# Patient Record
Sex: FEMALE | Race: WHITE | Employment: OTHER | URBAN - NONMETROPOLITAN AREA
[De-identification: names, ages, dates, MRNs, and addresses within clinical notes are randomized per-mention and may not be internally consistent; named-entity substitution may affect disease eponyms.]

---

## 2017-12-22 ENCOUNTER — HOSPITAL ENCOUNTER (EMERGENCY)
Age: 52
Discharge: HOME | End: 2017-12-22
Payer: COMMERCIAL

## 2017-12-22 VITALS
DIASTOLIC BLOOD PRESSURE: 92 MMHG | SYSTOLIC BLOOD PRESSURE: 141 MMHG | TEMPERATURE: 97.5 F | HEART RATE: 83 BPM | BODY MASS INDEX: 35.7 KG/M2 | WEIGHT: 208 LBS | RESPIRATION RATE: 18 BRPM | OXYGEN SATURATION: 97 %

## 2017-12-22 DIAGNOSIS — N30.01 ACUTE CYSTITIS WITH HEMATURIA: Primary | ICD-10-CM

## 2017-12-22 LAB
BILIRUBIN URINE: NEGATIVE
BLOOD, URINE: ABNORMAL
CHARACTER, URINE: ABNORMAL
COLOR: YELLOW
GLUCOSE, URINE: NEGATIVE MG/DL
KETONES, URINE: NEGATIVE
LEUKOCYTES, UA: ABNORMAL
NITRATE, UA: NEGATIVE
PH UA: 6.5 (ref 5–9)
PROTEIN UA: NEGATIVE MG/DL
REFLEX TO URINE C & S: ABNORMAL
SPECIFIC GRAVITY UA: 1.01 (ref 1–1.03)
UROBILINOGEN, URINE: 0.2 EU/DL (ref 0–1)

## 2017-12-22 PROCEDURE — 87086 URINE CULTURE/COLONY COUNT: CPT

## 2017-12-22 PROCEDURE — 81003 URINALYSIS AUTO W/O SCOPE: CPT

## 2017-12-22 PROCEDURE — 99214 OFFICE O/P EST MOD 30 MIN: CPT

## 2017-12-22 PROCEDURE — 99213 OFFICE O/P EST LOW 20 MIN: CPT | Performed by: NURSE PRACTITIONER

## 2017-12-22 RX ORDER — PHENAZOPYRIDINE HYDROCHLORIDE 200 MG/1
200 TABLET, FILM COATED ORAL 3 TIMES DAILY PRN
Qty: 6 TABLET | Refills: 0 | Status: SHIPPED | OUTPATIENT
Start: 2017-12-22 | End: 2017-12-24

## 2017-12-22 RX ORDER — NITROFURANTOIN 25; 75 MG/1; MG/1
100 CAPSULE ORAL 2 TIMES DAILY
Qty: 14 CAPSULE | Refills: 0 | Status: SHIPPED | OUTPATIENT
Start: 2017-12-22 | End: 2017-12-29

## 2017-12-22 ASSESSMENT — PAIN DESCRIPTION - PAIN TYPE: TYPE: ACUTE PAIN

## 2017-12-22 ASSESSMENT — ENCOUNTER SYMPTOMS
COUGH: 0
DIARRHEA: 0
VOMITING: 0
NAUSEA: 1
EYE ITCHING: 0
CONSTIPATION: 0
RHINORRHEA: 0
EYE REDNESS: 0
BACK PAIN: 0
TROUBLE SWALLOWING: 0
EYE DISCHARGE: 0
ABDOMINAL PAIN: 0
SHORTNESS OF BREATH: 0
EYE PAIN: 0
CHEST TIGHTNESS: 0
SORE THROAT: 0
WHEEZING: 0
SINUS PRESSURE: 0

## 2017-12-22 ASSESSMENT — PAIN DESCRIPTION - DESCRIPTORS: DESCRIPTORS: ACHING

## 2017-12-22 ASSESSMENT — PAIN SCALES - GENERAL: PAINLEVEL_OUTOF10: 6

## 2017-12-22 ASSESSMENT — PAIN DESCRIPTION - LOCATION: LOCATION: ABDOMEN

## 2017-12-22 ASSESSMENT — PAIN DESCRIPTION - ORIENTATION: ORIENTATION: LOWER

## 2017-12-22 NOTE — ED PROVIDER NOTES
[facility]  Urgent Care Encounter      CHIEF COMPLAINT       Chief Complaint   Patient presents with    Dysuria    Urinary Frequency    Urinary Retention    Nausea       Nurses Notes reviewed and I agree except as noted in the HPI. HISTORY OF PRESENT ILLNESS   Kenisha Swanson is a 46 y.o. female who presents To urgent care complaints of dysuria. Patient states her symptoms started Monday and progressively worsened over the past few days. She states that she has increased her water intake and start drinking cranberry juice without any improvement of her symptoms. The history is provided by the patient. Dysuria    This is a new problem. The current episode started more than 2 days ago. The problem occurs every urination. The problem has not changed since onset. The quality of the pain is described as burning and shooting. The pain is at a severity of 6/10. The patient is experiencing no pain. There has been no fever. She is sexually active. There is no history of pyelonephritis. Associated symptoms include nausea, frequency, hematuria, urgency and flank pain. Pertinent negatives include no chills, no sweats, no vomiting and no discharge. She has tried nothing for the symptoms. Her past medical history is significant for recurrent UTIs. REVIEW OF SYSTEMS     Review of Systems   Constitutional: Negative for activity change, appetite change, chills, fatigue and fever. HENT: Negative for congestion, ear discharge, ear pain, hearing loss, postnasal drip, rhinorrhea, sinus pressure, sore throat and trouble swallowing. Eyes: Negative for pain, discharge, redness and itching. Respiratory: Negative for cough, chest tightness, shortness of breath and wheezing. Cardiovascular: Negative for chest pain, palpitations and leg swelling. Gastrointestinal: Positive for nausea. Negative for abdominal pain, constipation, diarrhea and vomiting. Endocrine: Negative.     Genitourinary: Positive for dysuria, flank pain, frequency, hematuria and urgency. Musculoskeletal: Negative for arthralgias, back pain, joint swelling and myalgias. Skin: Negative. Neurological: Negative for dizziness, light-headedness and headaches. Hematological: Negative. PAST MEDICAL HISTORY         Diagnosis Date    Arthritis     GERD (gastroesophageal reflux disease)     Lupus     Raynaud disease     Sjogren's syndrome ([facility] [address]        SURGICAL HISTORY     Patient  has a past surgical history that includes Cardiac surgery; Tonsillectomy; Adenoidectomy; Cholecystectomy; Appendectomy; Abdomen surgery; back surgery; Foot surgery; hernia repair; and Hysterectomy. CURRENT MEDICATIONS       Discharge Medication List as of 12/22/2017  9:33 AM      CONTINUE these medications which have NOT CHANGED    Details   GABAPENTIN PO Take 1,800 mg by mouthHistorical Med      cyclobenzaprine (FLEXERIL) 10 MG tablet Take 10 mg by mouth 3 times daily as needed for Muscle spasms      loratadine (CLARITIN) 10 MG tablet Take 1 tablet by mouth daily, Disp-15 tablet, R-0      esomeprazole Magnesium (NEXIUM) 40 MG PACK Take 40 mg by mouth daily      milnacipran HCl (SAVELLA) 50 MG TABS Take 50 mg by mouth 2 times daily      hydroxychloroquine (PLAQUENIL) 200 MG tablet Take by mouth daily Once daily except tues thurs sat is twice a day      topiramate (TOPAMAX) 25 MG tablet Take 25 mg by mouth 2 times daily      escitalopram (LEXAPRO) 20 MG tablet Take 20 mg by mouth nightly      cevimeline (EVOXAC) 30 MG capsule Take 30 mg by mouth 4 times daily      acetaminophen (TYLENOL) 325 MG tablet Take 325 mg by mouth every 6 hours as needed for Pain      ibuprofen (ADVIL;MOTRIN) 400 MG tablet Take 400 mg by mouth every 6 hours as needed for Pain             ALLERGIES     Patient is is allergic to biaxin [clarithromycin]. FAMILY HISTORY     Patient's family history is not on file.     SOCIAL HISTORY     Patient  reports that she quit smoking about 19 months ago. She has never used smokeless tobacco. She reports that she does not drink alcohol or use drugs. PHYSICAL EXAM     ED TRIAGE VITALS  BP: (!) 141/92, Temp: 97.5 °F (36.4 °C), Pulse: 83, Resp: 18, SpO2: 97 %  Physical Exam   Constitutional: She is oriented to person, place, and time. She appears well-developed and well-nourished. No distress. HENT:   Head: Normocephalic and atraumatic. Right Ear: External ear normal.   Left Ear: External ear normal.   Nose: Nose normal.   Mouth/Throat: Oropharynx is clear and moist. No oropharyngeal exudate. Eyes: Conjunctivae and EOM are normal. Pupils are equal, round, and reactive to light. Neck: Normal range of motion. Neck supple. No JVD present. No tracheal deviation present. No thyromegaly present. Cardiovascular: Normal rate, regular rhythm and normal heart sounds. Exam reveals no gallop and no friction rub. No murmur heard. Pulmonary/Chest: Effort normal and breath sounds normal. No stridor. No respiratory distress. She has no wheezes. She has no rales. She exhibits no tenderness. Abdominal: Soft. Bowel sounds are normal. She exhibits no distension and no mass. There is tenderness in the suprapubic area. There is CVA tenderness (bilateral). There is no rebound and no guarding. Lymphadenopathy:     She has no cervical adenopathy. Neurological: She is alert and oriented to person, place, and time. Skin: Skin is warm and dry. Psychiatric: She has a normal mood and affect. Her behavior is normal. Judgment normal.   Nursing note and vitals reviewed. DIAGNOSTIC RESULTS   Labs:No results found for this visit on 12/22/17. IMAGING:  No orders to display     URGENT CARE COURSE:     Vitals:    12/22/17 0910   BP: (!) 141/92   Pulse: 83   Resp: 18   Temp: 97.5 °F (36.4 °C)   TempSrc: Temporal   SpO2: 97%   Weight: 208 lb (94.3 kg)       Medications - No data to display  PROCEDURES:  None  FINAL IMPRESSION      1.  Acute

## 2017-12-23 LAB
ORGANISM: ABNORMAL
URINE CULTURE REFLEX: ABNORMAL

## 2018-03-10 ENCOUNTER — HOSPITAL ENCOUNTER (EMERGENCY)
Age: 53
Discharge: HOME | End: 2018-03-10
Payer: COMMERCIAL

## 2018-03-10 VITALS
HEIGHT: 64 IN | WEIGHT: 212 LBS | SYSTOLIC BLOOD PRESSURE: 147 MMHG | DIASTOLIC BLOOD PRESSURE: 86 MMHG | BODY MASS INDEX: 36.19 KG/M2 | HEART RATE: 90 BPM | RESPIRATION RATE: 18 BRPM | OXYGEN SATURATION: 99 % | TEMPERATURE: 98.1 F

## 2018-03-10 DIAGNOSIS — J01.10 ACUTE FRONTAL SINUSITIS, RECURRENCE NOT SPECIFIED: ICD-10-CM

## 2018-03-10 DIAGNOSIS — J01.00 ACUTE MAXILLARY SINUSITIS, RECURRENCE NOT SPECIFIED: Primary | ICD-10-CM

## 2018-03-10 PROCEDURE — 99213 OFFICE O/P EST LOW 20 MIN: CPT | Performed by: NURSE PRACTITIONER

## 2018-03-10 PROCEDURE — 99213 OFFICE O/P EST LOW 20 MIN: CPT

## 2018-03-10 RX ORDER — AMOXICILLIN AND CLAVULANATE POTASSIUM 875; 125 MG/1; MG/1
1 TABLET, FILM COATED ORAL 2 TIMES DAILY
Qty: 14 TABLET | Refills: 0 | Status: SHIPPED | OUTPATIENT
Start: 2018-03-10 | End: 2018-03-17

## 2018-03-10 ASSESSMENT — ENCOUNTER SYMPTOMS
CHEST TIGHTNESS: 0
SHORTNESS OF BREATH: 0
NAUSEA: 0
VOMITING: 0
DIARRHEA: 0
COUGH: 1
RHINORRHEA: 1
SINUS PAIN: 1
SORE THROAT: 1
SINUS PRESSURE: 1

## 2018-03-10 ASSESSMENT — PAIN DESCRIPTION - DESCRIPTORS: DESCRIPTORS: THROBBING

## 2018-03-10 ASSESSMENT — PAIN DESCRIPTION - LOCATION: LOCATION: FACE

## 2018-03-10 ASSESSMENT — PAIN DESCRIPTION - ORIENTATION: ORIENTATION: RIGHT

## 2018-03-10 ASSESSMENT — PAIN SCALES - GENERAL: PAINLEVEL_OUTOF10: 4

## 2018-03-10 NOTE — ED PROVIDER NOTES
[facility] URGENT CARE  Urgent Care Encounter       CHIEF COMPLAINT       Chief Complaint   Patient presents with    Sinusitis     c/o yellow nasal drainage, right facial pain, post nasal drip with cough       Nurses Notes reviewed and I agree except as noted in the HPI. HISTORY OF PRESENT ILLNESS   Dalila Collins is a 46 y.o. female who presents With complaints of right sinus pain and drainage. Symptoms have been present for the last 7-10 days. Drainage is described as yellow and foul smelling. She is having PND and states that the drainage has a foul taste as well. She feels like she has swelling to the right side of her face. She denies fever but has had occasional chills. She does have occasional cough due to sinus drainage and mild sore throat. She has been treating her symptoms with Flonase, saline nasal spray, Claritin, and sinus rinses. She did receive 4 doses of amoxicillin on February 17 prior to a dental procedure. The history is provided by the patient. No  was used. REVIEW OF SYSTEMS     Review of Systems   Constitutional: Positive for appetite change, chills and fatigue (mild). Negative for fever. HENT: Positive for congestion, facial swelling (right), postnasal drip, rhinorrhea, sinus pain, sinus pressure and sore throat. Negative for ear pain. Respiratory: Positive for cough. Negative for chest tightness and shortness of breath. Cardiovascular: Negative for chest pain. Gastrointestinal: Negative for diarrhea, nausea and vomiting. Musculoskeletal: Negative for myalgias. Neurological: Positive for headaches. Negative for dizziness. PAST MEDICAL HISTORY         Diagnosis Date    Arthritis     GERD (gastroesophageal reflux disease)     Lupus     Raynaud disease     Sjogren's syndrome ([facility] [address]        SURGICAL HISTORY     Patient  has a past surgical history that includes Cardiac surgery; Tonsillectomy; Adenoidectomy;  Cholecystectomy;

## 2018-03-10 NOTE — ED TRIAGE NOTES
TO room 3 c/o right facial pain, post nasal drip cough,  And yellow nasal drainage. Reports I had 4 teeth pulled 2/19/18 and the f;luid would run through my mouth out my nose. Not sure if connected but that Id bring up.  There is irritation on my right upper gum but might  Be new denture plate not being adjusted\"

## 2018-03-11 ASSESSMENT — ENCOUNTER SYMPTOMS: FACIAL SWELLING: 1

## 2018-07-09 ENCOUNTER — HOSPITAL ENCOUNTER (EMERGENCY)
Age: 53
Discharge: HOME | End: 2018-07-09
Payer: COMMERCIAL

## 2018-07-09 VITALS
RESPIRATION RATE: 16 BRPM | WEIGHT: 212 LBS | HEART RATE: 77 BPM | DIASTOLIC BLOOD PRESSURE: 84 MMHG | TEMPERATURE: 97.3 F | OXYGEN SATURATION: 98 % | SYSTOLIC BLOOD PRESSURE: 134 MMHG | BODY MASS INDEX: 36.39 KG/M2

## 2018-07-09 DIAGNOSIS — S39.012A LUMBAR STRAIN, INITIAL ENCOUNTER: Primary | ICD-10-CM

## 2018-07-09 DIAGNOSIS — M62.838 SPASM OF MUSCLE: ICD-10-CM

## 2018-07-09 LAB
BILIRUBIN URINE: NEGATIVE
BLOOD, URINE: NEGATIVE
CHARACTER, URINE: CLEAR
COLOR: YELLOW
GLUCOSE, URINE: NEGATIVE MG/DL
KETONES, URINE: NEGATIVE
LEUKOCYTES, UA: NEGATIVE
NITRATE, UA: NEGATIVE
PH UA: 5.5 (ref 5–9)
PROTEIN UA: NEGATIVE MG/DL
REFLEX TO URINE C & S: NORMAL
SPECIFIC GRAVITY UA: <= 1.005 (ref 1–1.03)
UROBILINOGEN, URINE: 0.2 EU/DL (ref 0–1)

## 2018-07-09 PROCEDURE — 99214 OFFICE O/P EST MOD 30 MIN: CPT

## 2018-07-09 PROCEDURE — 6360000002 HC RX W HCPCS: Performed by: NURSE PRACTITIONER

## 2018-07-09 PROCEDURE — 96372 THER/PROPH/DIAG INJ SC/IM: CPT

## 2018-07-09 PROCEDURE — 99212 OFFICE O/P EST SF 10 MIN: CPT | Performed by: NURSE PRACTITIONER

## 2018-07-09 PROCEDURE — 81003 URINALYSIS AUTO W/O SCOPE: CPT

## 2018-07-09 RX ORDER — NAPROXEN 500 MG/1
500 TABLET ORAL 2 TIMES DAILY WITH MEALS
Qty: 30 TABLET | Refills: 0 | Status: SHIPPED | OUTPATIENT
Start: 2018-07-09

## 2018-07-09 RX ORDER — CYCLOBENZAPRINE HCL 10 MG
10 TABLET ORAL 3 TIMES DAILY PRN
Qty: 15 TABLET | Refills: 0 | Status: SHIPPED | OUTPATIENT
Start: 2018-07-09

## 2018-07-09 RX ORDER — NORTRIPTYLINE HYDROCHLORIDE 75 MG/1
75 CAPSULE ORAL NIGHTLY
COMMUNITY
End: 2021-11-29 | Stop reason: CLARIF

## 2018-07-09 RX ORDER — KETOROLAC TROMETHAMINE 30 MG/ML
60 INJECTION, SOLUTION INTRAMUSCULAR; INTRAVENOUS ONCE
Status: COMPLETED | OUTPATIENT
Start: 2018-07-09 | End: 2018-07-09

## 2018-07-09 RX ADMIN — KETOROLAC TROMETHAMINE 60 MG: 30 INJECTION, SOLUTION INTRAMUSCULAR at 18:17

## 2018-07-09 ASSESSMENT — PAIN SCALES - GENERAL
PAINLEVEL_OUTOF10: 6
PAINLEVEL_OUTOF10: 6

## 2018-07-09 ASSESSMENT — PAIN DESCRIPTION - FREQUENCY: FREQUENCY: INTERMITTENT

## 2018-07-09 ASSESSMENT — ENCOUNTER SYMPTOMS
SHORTNESS OF BREATH: 0
BACK PAIN: 1
ABDOMINAL PAIN: 0
CHOKING: 0
VOMITING: 0
NAUSEA: 0

## 2018-07-09 ASSESSMENT — PAIN DESCRIPTION - DESCRIPTORS: DESCRIPTORS: ACHING

## 2018-07-09 ASSESSMENT — PAIN DESCRIPTION - LOCATION: LOCATION: FLANK

## 2018-07-09 ASSESSMENT — PAIN DESCRIPTION - ORIENTATION: ORIENTATION: LEFT

## 2018-07-09 ASSESSMENT — PAIN DESCRIPTION - PAIN TYPE: TYPE: ACUTE PAIN

## 2018-07-09 NOTE — ED PROVIDER NOTES
[facility]  Urgent Care Encounter       CHIEF COMPLAINT       Chief Complaint   Patient presents with    Flank Pain     left flank pain, frequency, urgency       Nurses Notes reviewed and I agree except as noted in the HPI. HISTORY OF PRESENT ILLNESS   Mraa Molina is a 48 y.o. female who presents For evaluation of left back/flank pain for the past 2 days. Patient reports that the pain began as a dull ache and has continued to progress over the past 2 days. She denies any known injury or recent strenuous activity, however she states that she has had issues with her back in the past.  She reports mild urinary frequency, however she denies any dysuria or any other concerning symptoms such as chills or fever at this time. The history is provided by the patient. REVIEW OF SYSTEMS     Review of Systems   Constitutional: Negative for chills and fever. Respiratory: Negative for choking and shortness of breath. Cardiovascular: Negative for chest pain. Gastrointestinal: Negative for abdominal pain, nausea and vomiting. Genitourinary: Negative for dysuria and urgency. Musculoskeletal: Positive for back pain. Skin: Negative for rash. Neurological: Negative for weakness and numbness. PAST MEDICAL HISTORY         Diagnosis Date    Arthritis     GERD (gastroesophageal reflux disease)     Lupus     Raynaud disease     Sjogren's syndrome ([facility] [address]        SURGICAL HISTORY     Patient  has a past surgical history that includes Cardiac surgery; Tonsillectomy; Adenoidectomy; Cholecystectomy; Appendectomy; Abdomen surgery; back surgery; Foot surgery; hernia repair; and Hysterectomy.     CURRENT MEDICATIONS       Previous Medications    ACETAMINOPHEN (TYLENOL) 325 MG TABLET    Take 650 mg by mouth every 6 hours as needed for Pain     CEVIMELINE (EVOXAC) 30 MG CAPSULE    Take 30 mg by mouth 4 times daily    ESCITALOPRAM (LEXAPRO) 20 MG TABLET    Take 20 mg by mouth nightly to have high fevers, if the pain worsens, or if she has difficulty urinating she needs to present directly to the emergency department. She is agreeable to plan for discharge with outpatient follow-up as discussed. PATIENT REFERRED TO:  Sisi Kaur., DO  [address] [facility]  [phone]    Call in 1 week        DISCHARGE MEDICATIONS:  New Prescriptions    CYCLOBENZAPRINE (FLEXERIL) 10 MG TABLET    Take 1 tablet by mouth 3 times daily as needed for Muscle spasms . Do not drive or operate heavy machinery while taking this medication.     NAPROXEN (NAPROSYN) 500 MG TABLET    Take 1 tablet by mouth 2 times daily (with meals)       Discontinued Medications    CYCLOBENZAPRINE (FLEXERIL) 10 MG TABLET    Take 10 mg by mouth 3 times daily as needed for Muscle spasms    NAPROXEN SODIUM (ALEVE PO)    Take by mouth    TOPIRAMATE (TOPAMAX) 25 MG TABLET    Take 25 mg by mouth 2 times daily       Current Discharge Medication List          CARLITA Jones CNP    (Please note that portions of this note were completed with a voice recognition program.  Efforts were made to edit the dictations but occasionally words are mis-transcribed.)          CARLITA Jones CNP  07/09/18 3702

## 2018-07-09 NOTE — ED NOTES
Patient understood instructions verbally,  Follow up with PCP with any concerns, or worse flank pain with bloody urine, fever, follow up with ED. E-script,. ambulated self to lobby,stable condition. Vitals taken, pain level 5/10.      Yoandy Ledbetter LPN  11/35/99 1011

## 2018-08-23 ENCOUNTER — HOSPITAL ENCOUNTER (OUTPATIENT)
Dept: PHYSICAL THERAPY | Age: 53
Setting detail: THERAPIES SERIES
Discharge: HOME | End: 2018-08-23
Payer: COMMERCIAL

## 2018-08-23 PROCEDURE — 97162 PT EVAL MOD COMPLEX 30 MIN: CPT

## 2018-08-23 PROCEDURE — 97110 THERAPEUTIC EXERCISES: CPT

## 2018-08-23 PROCEDURE — G8991 OTHER PT/OT GOAL STATUS: HCPCS

## 2018-08-23 PROCEDURE — G8990 OTHER PT/OT CURRENT STATUS: HCPCS

## 2018-08-23 ASSESSMENT — PAIN DESCRIPTION - LOCATION: LOCATION: BACK;KNEE;HIP

## 2018-08-23 ASSESSMENT — PAIN DESCRIPTION - ORIENTATION: ORIENTATION: RIGHT;LEFT

## 2018-08-23 ASSESSMENT — PAIN DESCRIPTION - PAIN TYPE: TYPE: CHRONIC PAIN

## 2018-08-23 ASSESSMENT — PAIN SCALES - GENERAL: PAINLEVEL_OUTOF10: 8

## 2018-08-23 NOTE — FLOWSHEET NOTE
in any one section apply but please just check the spot that indicates the statement which most clearly describes your problem. Section 1 - Pain Intensity The pain is moderate at the moment = 2   Section 2 - Personal Care (Washing, dressing, etc) It is painful to look after myself and I am slow and careful = 2   Section 3 - Lifting Pain prevents me from lifting heavy weights, but I can manage light to medium weights if they are conveniently positioned = 3   Section 4 - Walking Pain prevents me from walking more than 1/2 mile = 2     Section 5 - Sitting   I can only sit in my favorite chair as long as I like = 1   Section 6 - Standing Pain prevents me from standing for more than 30 minutes = 3   Section 7 - Sleeping Because of pain I have less than 4 hours sleep = 3   Section 8 - Sex Life (if applicable) Not applicable   Section 9 - Social Life My social life is normal but increases the degree of pain = 1     Section 10 - Travelling Pain is bad but I manage journeys over two hours = 2   Total Score  19/45     Total score/total possible score   X 100  42%       Score Interpretation:  0%-20%: minimal disability The patient can cope with most living activities. Usually no treatment is indicated apart from advice on lifting sitting and exercise. 21%-40%: moderate disability The patient experiences more pain and difficulty with sitting, lifting and standing. Travel and social life are more difficult and they may be disabled from work. Personal care, sexual activity and sleeping are not grossly affected and the patient can usually be managed by conservative means. 41%-60%: severe disability Pain remains themain problem with this group but activities of daily living are affected. These patient require a detailed investigation. 61%-80%: crippled Back pain impinges on all aspects of the patient's life. Positive intervention is required. 81%-100%:  These patients are either bed-bound or exaggerating their functional, activity limitations and/or participation restrictions. See restrictions and objective section above for details. Clinical Presentation: Moderate - Evolving with Changing Characteristics: chronic LBP with LE radicular symptoms, no significant change with assessment    Decision Making: Moderate Complexity due to see above, no improvement in LE symptoms during assessment, starting in pool. Decision making was based on patient assessment and decision making process in determining plan of care and establishing reasonable expectations for measurable functional outcomes. Evaluation Complexity: Based on the findings of patient history, examination, clinical presentation, and decision making during this evaluation, the evaluation of Jose Tabor  is of medium complexity. Goals:       Short term goals  Time Frame for Short term goals: 4 weeks  Short term goal 1: Patient will report decreased LBP and LE pain from baseline 8/10 to less than 5/10 in order to tolerate standing and walking longer durations. Short term goal 2: Patient will increase BLE flexibility to 0-80 deg, and piriformis flexibility WFL in order to bend forward without increasing pain. Short term goal 3: Patient will demonstrate good abdominal bracing with UE and LE pool exercises x20 reps in order to stabilize lumbar spine and progress to land based PT. Short term goal 4: Patient will be IND with HEP and with pool program in order to continue benefits of pool outside of therapy. Long term goals  Time Frame for Long term goals : 8 weeks  Long term goal 1: Patient will improve score of Oswestry LBP disability questionnaire from baseline 42% impairment to less than 20% in order to babysit grandchildren without difficulty. Long term goal 2: Patient will squat, lunge, and climb stairs without UE support in order to decrease risk of falling during household tasks.   Long term goal 3: Patient will demonstrate good lifting techiques

## 2018-08-28 ENCOUNTER — HOSPITAL ENCOUNTER (OUTPATIENT)
Dept: PHYSICAL THERAPY | Age: 53
Setting detail: THERAPIES SERIES
Discharge: HOME | End: 2018-08-28
Payer: COMMERCIAL

## 2018-08-28 PROCEDURE — 97113 AQUATIC THERAPY/EXERCISES: CPT

## 2018-08-28 ASSESSMENT — PAIN DESCRIPTION - ORIENTATION: ORIENTATION: LOWER

## 2018-08-28 ASSESSMENT — PAIN DESCRIPTION - LOCATION: LOCATION: BACK

## 2018-08-28 ASSESSMENT — PAIN SCALES - GENERAL: PAINLEVEL_OUTOF10: 5

## 2018-08-28 ASSESSMENT — PAIN DESCRIPTION - PAIN TYPE: TYPE: CHRONIC PAIN

## 2018-08-28 NOTE — PROGRESS NOTES
Therapy - Soft Tissue Mobilization, Manual Therapy - Joint Manipulation, Home Exercise Program, Modalities    Goals:       Short term goals  Time Frame for Short term goals: 4 weeks  Short term goal 1: Patient will report decreased LBP and LE pain from baseline 8/10 to less than 5/10 in order to tolerate standing and walking longer durations. Short term goal 2: Patient will increase BLE flexibility to 0-80 deg, and piriformis flexibility WFL in order to bend forward without increasing pain. Short term goal 3: Patient will demonstrate good abdominal bracing with UE and LE pool exercises x20 reps in order to stabilize lumbar spine and progress to land based PT. Short term goal 4: Patient will be IND with HEP and with pool program in order to continue benefits of pool outside of therapy. Long term goals  Time Frame for Long term goals : 8 weeks  Long term goal 1: Patient will improve score of Oswestry LBP disability questionnaire from baseline 42% impairment to less than 20% in order to babysit grandchildren without difficulty. Long term goal 2: Patient will squat, lunge, and climb stairs without UE support in order to decrease risk of falling during household tasks. Long term goal 3: Patient will demonstrate good lifting techiques in order to care for grandchildren and volunteer at their school.           Dyllan Torres, [id number]

## 2018-08-30 ENCOUNTER — HOSPITAL ENCOUNTER (OUTPATIENT)
Dept: PHYSICAL THERAPY | Age: 53
Setting detail: THERAPIES SERIES
Discharge: HOME | End: 2018-08-30
Payer: COMMERCIAL

## 2018-08-30 PROCEDURE — 97113 AQUATIC THERAPY/EXERCISES: CPT

## 2018-08-30 ASSESSMENT — PAIN SCALES - GENERAL: PAINLEVEL_OUTOF10: 5

## 2018-08-30 ASSESSMENT — PAIN DESCRIPTION - LOCATION: LOCATION: BACK

## 2018-08-30 ASSESSMENT — PAIN DESCRIPTION - PAIN TYPE: TYPE: CHRONIC PAIN

## 2018-08-30 ASSESSMENT — PAIN DESCRIPTION - ORIENTATION: ORIENTATION: LOWER

## 2018-08-30 NOTE — PROGRESS NOTES
[place]     Time In: 3650  Time Out: 0830  Minutes: 43  Timed Code Treatment Minutes: 43 Minutes     Date: 2018  Patient Name: Naty Nevarez,  Gender:  female        CSN: [id number]   : 1965  (48 y.o.)  Referral Date : 18    Referring Practitioner: Marilee Schwarz MD      Diagnosis: N71.188 (ICD-10-CM) - Spinal stenosis, lumbar region with neurogenic claudication  Treatment Diagnosis: Lumbago, spinal stiffness, B hip stiffness, muscular weakness, numbness   Additional Pertinent Hx: Hernia repair, Bowel resection and muscle replacement , Lupus, Sjogrens syndrome, Reynaud's syndrome, chronic arthritis. General:  PT Visit Information  Onset Date: 18  PT Insurance Information: [employer] - Secondary Medicare ([facility]) -Aquatic and modalities covered. Precert YES THROUGH [employer]. WE HAVE AUTHORIZATION FOR 12 VISITS FROM 18-18 FOR CPT CODES 30471, [id number], [id number], [id number], [id number] AND ONE VISIT FOR 37725, [id number], [id number]   Total # of Visits Approved: 12  Total # of Visits to Date: 3  Plan of Care/Certification Expiration Date: 10/18/18  Progress Note Counter: 3/10 for PN. Subjective:  Comments: Physician follow up: Dr. Familia Ford (pain management), meeting with back surgeon   Other (Comment): Aquatherapy for core abd strengthening prior to spine surgery     Subjective: Patient reporting low back pain 5/10 today. She has been performing HEP at home with good tolerance. After last pool session she felt good relief.       Pain:  Patient Currently in Pain: Yes  Pain Assessment: 0-10  Pain Level: 5  Pain Type: Chronic pain  Pain Location: Back  Pain Orientation: Lower  Pain Radiating Towards: BLE radicular pain       Objective    LE Warm Up: Ambulation (F,L,R): x 2 lap each with bracing  LE Streches: Not today d/t time - Seated piriformis and hamstring stretch at step 10 seconds x 3 bilat. Bilateral calf streth off 4' steps 15 seconds x 3   Static Strengthening UEs  Shoulder Flex: X 10 in 4' with bracing   Shoulder Ext: X 10 in 4' with bracing. Shoulder ABD/ADD: X 10 in 4' with bracing  Shoulder Horiz ABD/ADD: X 10 in 4' with bracing  Rows: X 10 holding 2-3 seconds in 4' with bracing. Post shoulder rolls x 10 with bracing. Static Strengthening LEs  Heel/Toe Raises: X 10 in 4' with bracing  Squats: X 10 in 4' with bracing  Marching: X 10 in 4' with bracing  Hamstring Curls: X 10 in 4' with bracing  4-Way Hip: 3 way hip X 10 in 4' with bracing - cues for smaller range of motion     Deep H2O LE  Bike: with noolde in 4'10\" x 3 minutes with bracing  Hang: with noodle in 4'10\" x 5 minutes - no pain during hang, ankle circles x10 each direction   Hip Flex/Ext: with noolde in 4'10\" x 1 minute with bracing  Hip ABD/ADD: with noodle in 4'10\" x 1 minute with bracing     Activity Tolerance:  Activity Tolerance: Patient Tolerated treatment well    Assessment:  Assessment: Continues to require cues for abdominal bracing and posture during exercises. Decreased pain during pool session.    Prognosis: Good       Patient Education:  Patient Education: Continue with HEP                      Plan:  Times per week: 2x/week  Plan weeks: 8 weeks  Specific instructions for Next Treatment: Aquatic therapy for lower back and BLEs - deep water unloading, seated piriformis and hamstring stretching, gastroc stretching off step, BLE and UE strength  Current Treatment Recommendations: Strengthening, ROM, Balance Training, Gait Training, Stair training, Functional Mobility Training, Manual Therapy - Soft Tissue Mobilization, Manual Therapy - Joint Manipulation, Home Exercise Program, Modalities    Goals:       Short term goals  Time Frame for Short term goals: 4 weeks  Short term goal 1: Patient will report decreased LBP and LE pain from baseline 8/10 to less than 5/10 in order to tolerate standing and walking

## 2018-09-11 ENCOUNTER — HOSPITAL ENCOUNTER (OUTPATIENT)
Dept: PHYSICAL THERAPY | Age: 53
Setting detail: THERAPIES SERIES
Discharge: HOME | End: 2018-09-11
Payer: COMMERCIAL

## 2018-09-11 PROCEDURE — 97113 AQUATIC THERAPY/EXERCISES: CPT

## 2018-09-11 ASSESSMENT — PAIN DESCRIPTION - PAIN TYPE: TYPE: CHRONIC PAIN

## 2018-09-11 ASSESSMENT — PAIN DESCRIPTION - LOCATION: LOCATION: BACK

## 2018-09-11 ASSESSMENT — PAIN SCALES - GENERAL: PAINLEVEL_OUTOF10: 7

## 2018-09-11 ASSESSMENT — PAIN DESCRIPTION - ORIENTATION: ORIENTATION: LOWER

## 2018-09-11 NOTE — PROGRESS NOTES
in 4' with bracing   Shoulder Ext: X 15 in 4' with bracing. Shoulder ABD/ADD: X 15 in 4' with bracing  Shoulder Horiz ABD/ADD: X 15 in 4' with bracing  Rows: X 15 holding 2-3 seconds in 4' with bracing. Post shoulder rolls x 15 with bracing. Static Strengthening LEs  Heel/Toe Raises: X 15 in 4' with bracing  Squats: X 10 in 4' with bracing  Marching: X 15 in 4' with bracing  Hamstring Curls: X 10 in 4' with bracing  4-Way Hip: 3 way hip X 10 in 4' with bracing - cues for smaller range of motion     Deep H2O LE  Bike: with noolde in 4'10\" x 3 minutes with bracing  Hang: with noodle in 4'10\" x 5 minutes -pain 3/10 during hang, ankle circles x10 each direction   Hip Flex/Ext: with noolde in 4'10\" x 1 minute with bracing  Hip ABD/ADD: with noodle in 4'10\" x 1 minute with bracing            Activity Tolerance:  Activity Tolerance: Patient Tolerated treatment well    Assessment:  Assessment: Made progressions with reps with patient tolerating well and having no complains of increase in pain with progressions made. Pain decreased to 3/10 with hang and was still 3/10 upon getting out of pool   Prognosis: Good       Patient Education:  Patient Education: Continue with bracing and monitor response to progressions made.                        Plan:  Times per week: 2x/week  Plan weeks: 8 weeks  Specific instructions for Next Treatment: Aquatic therapy for lower back and BLEs - deep water unloading, seated piriformis and hamstring stretching, gastroc stretching off step, BLE and UE strength  Current Treatment Recommendations: Strengthening, ROM, Balance Training, Gait Training, Stair training, Functional Mobility Training, Manual Therapy - Soft Tissue Mobilization, Manual Therapy - Joint Manipulation, Home Exercise Program, Modalities    Goals:       Short term goals  Time Frame for Short term goals: 4 weeks  Short term goal 1: Patient will report decreased LBP and LE pain from baseline 8/10 to less than 5/10 in order

## 2018-09-13 ENCOUNTER — APPOINTMENT (OUTPATIENT)
Dept: PHYSICAL THERAPY | Age: 53
End: 2018-09-13
Payer: COMMERCIAL

## 2018-09-18 ENCOUNTER — HOSPITAL ENCOUNTER (OUTPATIENT)
Dept: PHYSICAL THERAPY | Age: 53
Setting detail: THERAPIES SERIES
Discharge: HOME | End: 2018-09-18
Payer: COMMERCIAL

## 2018-09-18 PROCEDURE — 97113 AQUATIC THERAPY/EXERCISES: CPT

## 2018-09-18 ASSESSMENT — PAIN SCALES - GENERAL: PAINLEVEL_OUTOF10: 7

## 2018-09-18 NOTE — PROGRESS NOTES
[place]     Time In: 0800  Time Out: 0845  Minutes: 45  Timed Code Treatment Minutes: 45 Minutes     Date: 2018  Patient Name: Naty Nevarez,  Gender:  female        CSN: [id number]   : 1965  (48 y.o.)  Referral Date : 18    Referring Practitioner: Marilee Schwarz MD      Diagnosis: H62.235 (ICD-10-CM) - Spinal stenosis, lumbar region with neurogenic claudication  Treatment Diagnosis: Lumbago, spinal stiffness, B hip stiffness, muscular weakness, numbness   Additional Pertinent Hx: Hernia repair, Bowel resection and muscle replacement , Lupus, Sjogrens syndrome, Reynaud's syndrome, chronic arthritis. General:  PT Visit Information  Onset Date: 18  PT Insurance Information: UMR - Secondary Medicare ([facility]) -Aquatic and modalities covered. Precert YES THROUGH [employer]. WE HAVE AUTHORIZATION FOR 12 VISITS FROM 18-18 FOR CPT CODES 23840, 50798, 30060, [id number], [id number] AND ONE VISIT FOR 03268, 455 1011, [id number]   Total # of Visits Approved: 12  Total # of Visits to Date: 5  Plan of Care/Certification Expiration Date: 10/18/18  Progress Note Counter: 5/10 for PN. Subjective:  Comments: Physician follow up: Dr. Familia Ford (pain management), meeting with back surgeon   Other (Comment): Aquatherapy for core abd strengthening prior to spine surgery     Subjective: Patient states that she will get intermittent tingling in the left leg. Reports the pool therapy is helping. Pain:  Patient Currently in Pain: Yes  Pain Assessment: 0-10  Pain Level: 7 (6-7/10 Lower back, down Right leg into foot)    Objective         LE Warm Up: Ambulation (F,L,R): x2 lap each with bracing  LE Streches: Seated piriformis and hamstring stretch at step 10 seconds x 3 bilat.  Bilateral calf streth off 4' steps 15 seconds x 3   Static Strengthening UEs  Shoulder Flex: 15x in 4' with bracing   Shoulder current POC    Goals:       Short term goals  Time Frame for Short term goals: 4 weeks  Short term goal 1: Patient will report decreased LBP and LE pain from baseline 8/10 to less than 5/10 in order to tolerate standing and walking longer durations. Short term goal 2: Patient will increase BLE flexibility to 0-80 deg, and piriformis flexibility WFL in order to bend forward without increasing pain. Short term goal 3: Patient will demonstrate good abdominal bracing with UE and LE pool exercises x20 reps in order to stabilize lumbar spine and progress to land based PT. Short term goal 4: Patient will be IND with HEP and with pool program in order to continue benefits of pool outside of therapy. Long term goals  Time Frame for Long term goals : 8 weeks  Long term goal 1: Patient will improve score of Oswestry LBP disability questionnaire from baseline 42% impairment to less than 20% in order to babysit grandchildren without difficulty. Long term goal 2: Patient will squat, lunge, and climb stairs without UE support in order to decrease risk of falling during household tasks. Long term goal 3: Patient will demonstrate good lifting techiques in order to care for grandchildren and volunteer at their school. Carlos Angel.  Katy Krueger, [address], 9/18/2018

## 2018-09-20 ENCOUNTER — HOSPITAL ENCOUNTER (OUTPATIENT)
Dept: PHYSICAL THERAPY | Age: 53
Setting detail: THERAPIES SERIES
Discharge: HOME | End: 2018-09-20
Payer: COMMERCIAL

## 2018-09-20 PROCEDURE — 97113 AQUATIC THERAPY/EXERCISES: CPT

## 2018-09-20 ASSESSMENT — PAIN DESCRIPTION - LOCATION: LOCATION: BACK;LEG

## 2018-09-20 ASSESSMENT — PAIN DESCRIPTION - PAIN TYPE: TYPE: CHRONIC PAIN

## 2018-09-20 ASSESSMENT — PAIN SCALES - GENERAL: PAINLEVEL_OUTOF10: 7

## 2018-09-20 ASSESSMENT — PAIN DESCRIPTION - ORIENTATION: ORIENTATION: RIGHT;LOWER

## 2018-09-20 NOTE — PROGRESS NOTES
Manual Therapy - Soft Tissue Mobilization, Manual Therapy - Joint Manipulation, Home Exercise Program, Modalities  Plan Comment: Continue with current POC    Goals:       Short term goals  Time Frame for Short term goals: 4 weeks  Short term goal 1: Patient will report decreased LBP and LE pain from baseline 8/10 to less than 5/10 in order to tolerate standing and walking longer durations. Short term goal 2: Patient will increase BLE flexibility to 0-80 deg, and piriformis flexibility WFL in order to bend forward without increasing pain. Short term goal 3: Patient will demonstrate good abdominal bracing with UE and LE pool exercises x20 reps in order to stabilize lumbar spine and progress to land based PT. Short term goal 4: Patient will be IND with HEP and with pool program in order to continue benefits of pool outside of therapy. Long term goals  Time Frame for Long term goals : 8 weeks  Long term goal 1: Patient will improve score of Oswestry LBP disability questionnaire from baseline 42% impairment to less than 20% in order to babysit grandchildren without difficulty. Long term goal 2: Patient will squat, lunge, and climb stairs without UE support in order to decrease risk of falling during household tasks. Long term goal 3: Patient will demonstrate good lifting techiques in order to care for grandchildren and volunteer at their school.           Eagle Thomson, PT

## 2018-09-25 ENCOUNTER — HOSPITAL ENCOUNTER (OUTPATIENT)
Dept: PHYSICAL THERAPY | Age: 53
Setting detail: THERAPIES SERIES
Discharge: HOME | End: 2018-09-25
Payer: COMMERCIAL

## 2018-09-25 PROCEDURE — 97113 AQUATIC THERAPY/EXERCISES: CPT

## 2018-09-25 ASSESSMENT — PAIN SCALES - GENERAL: PAINLEVEL_OUTOF10: 3

## 2018-09-25 ASSESSMENT — PAIN DESCRIPTION - LOCATION: LOCATION: BACK;LEG

## 2018-09-25 ASSESSMENT — PAIN DESCRIPTION - ORIENTATION: ORIENTATION: LOWER;RIGHT

## 2018-09-25 ASSESSMENT — PAIN DESCRIPTION - PAIN TYPE: TYPE: CHRONIC PAIN

## 2018-09-27 ENCOUNTER — HOSPITAL ENCOUNTER (OUTPATIENT)
Dept: PHYSICAL THERAPY | Age: 53
Setting detail: THERAPIES SERIES
Discharge: HOME | End: 2018-09-27
Payer: COMMERCIAL

## 2018-09-27 PROCEDURE — 97113 AQUATIC THERAPY/EXERCISES: CPT

## 2018-09-27 ASSESSMENT — PAIN DESCRIPTION - LOCATION: LOCATION: BACK;LEG

## 2018-09-27 ASSESSMENT — PAIN DESCRIPTION - ORIENTATION: ORIENTATION: LOWER;RIGHT

## 2018-09-27 ASSESSMENT — PAIN DESCRIPTION - PAIN TYPE: TYPE: CHRONIC PAIN

## 2018-09-27 ASSESSMENT — PAIN SCALES - GENERAL: PAINLEVEL_OUTOF10: 3

## 2018-09-27 NOTE — PROGRESS NOTES
[place]     Time In: 0700  Time Out: 0745  Minutes: 45  Timed Code Treatment Minutes: 45 Minutes     Date: 2018  Patient Name: Jefferson Hooper,  Gender:  female        CSN: [id number]   : 1965  (48 y.o.)  Referral Date : 18    Referring Practitioner: Rogelio Wells MD      Diagnosis: J92.161 (ICD-10-CM) - Spinal stenosis, lumbar region with neurogenic claudication  Treatment Diagnosis: Lumbago, spinal stiffness, B hip stiffness, muscular weakness, numbness   Additional Pertinent Hx: Hernia repair, Bowel resection and muscle replacement , Lupus, Sjogrens syndrome, Reynaud's syndrome, chronic arthritis. General:  PT Visit Information  Onset Date: 18  PT Insurance Information: R - Secondary Medicare ([place] [id number]) -Aquatic and modalities covered. Precert YES THROUGH [employer]. WE HAVE AUTHORIZATION FOR 12 VISITS FROM 18-18 FOR CPT CODES 39057, [id number], 68760, [id number], [id number] AND ONE VISIT FOR 90552, [id number], 17392   Total # of Visits Approved: 12  Total # of Visits to Date: 8  Plan of Care/Certification Expiration Date: 10/18/18  Progress Note Counter:  for PN. Subjective:  Comments: Physician follow up: Dr. Jenn White (pain management), meeting with back surgeon   Other (Comment): Aquatherapy for core abd strengthening prior to spine surgery     Subjective: Patient rates lower back pain 3/10 today, but tuesday after her pool session she was feeling really good so she did some projects around the house. Afterward she was very sore and her legs were buckling.  Patient is motivated - she has contacted local swimming [employer] about membership and she is starting with the weight management program.      Pain:  Patient Currently in Pain: Yes  Pain Assessment: 0-10  Pain Level: 3  Pain Type: Chronic pain  Pain Location: Back, Leg  Pain Orientation: Lower, Right      Objective                                                                                    LE Warm Up: Ambulation (F,L,R): x2 lap each with bracing  LE Streches: Seated piriformis and hamstring stretch at step 10 seconds x 3 bilat. Bilateral calf streth off 4' steps 15 seconds x 3   Static Strengthening UEs  Shoulder Flex: 20x in 4' with bracing Open paddles  Shoulder Ext: 20x in 4' with bracing Open paddles  Shoulder ABD/ADD: 20x in 4' with bracing Open paddles  Shoulder Horiz ABD/ADD: 20x in 4' with bracing Open paddles  Rows: 20x holding 2-3 seconds in 4' with bracing. Posterior shoulder rolls 15x with bracing. Push ups on bar x 10        Static Strengthening LEs  Heel/Toe Raises: 20x each in 4' with bracing  Squats: 20 x  in 4' with bracing  Marchin x in 4' with bracing  Hamstring Curls: 20 x bilateral in 4' with bracing  4-Way Hip: 3-way hip 20x bilateral in 4' with bracing - cues for smaller range of motion      Deep H2O LE  Bike: with noodle in 4'10\" x3 minutes with bracing - today trialed aquajogger belt as patient was curious about equipment to purchase for HEP. Hang: with noodle in 4'10\" x 3 minutes - no pain during hang, ankle circles x10 each direction   Hip Flex/Ext: with noodle in 4'10\" x1 minute with bracing  Hip ABD/ADD: with noodle in 4'10\" x1 minute with bracing                             Activity Tolerance:  Activity Tolerance: Patient Tolerated treatment well    Assessment:  Assessment: Patient tolerates pool session well, performing 20 reps UE and LE. Patient can recall most of program but needed reminders for the new UE exercises added. We will finish remianing visits and discharge, she is planning to join local pool. Prognosis: Good       Patient Education:  Patient Education: Continue with bracing and watching posture.                        Plan:  Times per week: 2x/week  Plan weeks: 8 weeks  Specific instructions for Next Treatment: Aquatic therapy for lower back and BLEs - deep water unloading, seated piriformis and hamstring stretching, gastroc stretching off step, BLE and UE strength  Current Treatment Recommendations: Strengthening, ROM, Balance Training, Gait Training, Stair training, Functional Mobility Training, Manual Therapy - Soft Tissue Mobilization, Manual Therapy - Joint Manipulation, Home Exercise Program, Modalities  Plan Comment: Continue with current POC - finish remaining visits and discharge    Goals:       Short term goals  Time Frame for Short term goals: 4 weeks  Short term goal 1: Patient will report decreased LBP and LE pain from baseline 8/10 to less than 5/10 in order to tolerate standing and walking longer durations. Short term goal 2: Patient will increase BLE flexibility to 0-80 deg, and piriformis flexibility WFL in order to bend forward without increasing pain. Short term goal 3: Patient will demonstrate good abdominal bracing with UE and LE pool exercises x20 reps in order to stabilize lumbar spine and progress to land based PT. Short term goal 4: Patient will be IND with HEP and with pool program in order to continue benefits of pool outside of therapy. Long term goals  Time Frame for Long term goals : 8 weeks  Long term goal 1: Patient will improve score of Oswestry LBP disability questionnaire from baseline 42% impairment to less than 20% in order to babysit grandchildren without difficulty. Long term goal 2: Patient will squat, lunge, and climb stairs without UE support in order to decrease risk of falling during household tasks. Long term goal 3: Patient will demonstrate good lifting techiques in order to care for grandchildren and volunteer at their school.           Katrina Schwarz, PT

## 2018-10-02 ENCOUNTER — HOSPITAL ENCOUNTER (OUTPATIENT)
Dept: PHYSICAL THERAPY | Age: 53
Setting detail: THERAPIES SERIES
Discharge: HOME | End: 2018-10-02
Payer: COMMERCIAL

## 2018-10-02 PROCEDURE — 97113 AQUATIC THERAPY/EXERCISES: CPT

## 2018-10-02 ASSESSMENT — PAIN SCALES - GENERAL: PAINLEVEL_OUTOF10: 5

## 2018-10-02 ASSESSMENT — PAIN DESCRIPTION - LOCATION: LOCATION: BACK

## 2018-10-02 ASSESSMENT — PAIN DESCRIPTION - ORIENTATION: ORIENTATION: RIGHT

## 2018-10-02 ASSESSMENT — PAIN DESCRIPTION - PAIN TYPE: TYPE: CHRONIC PAIN

## 2018-10-04 ENCOUNTER — HOSPITAL ENCOUNTER (OUTPATIENT)
Dept: PHYSICAL THERAPY | Age: 53
Setting detail: THERAPIES SERIES
Discharge: HOME | End: 2018-10-04
Payer: COMMERCIAL

## 2018-10-04 PROCEDURE — 97113 AQUATIC THERAPY/EXERCISES: CPT

## 2018-10-04 ASSESSMENT — PAIN DESCRIPTION - LOCATION: LOCATION: BACK

## 2018-10-04 ASSESSMENT — PAIN DESCRIPTION - PAIN TYPE: TYPE: CHRONIC PAIN

## 2018-10-04 ASSESSMENT — PAIN SCALES - GENERAL: PAINLEVEL_OUTOF10: 5

## 2018-10-04 ASSESSMENT — PAIN DESCRIPTION - ORIENTATION: ORIENTATION: RIGHT

## 2018-10-04 NOTE — PROGRESS NOTES
stretch 10 seconds x 3 bilat. Bilateral calf stretch off 4' steps 15 seconds x 3   Static Strengthening UEs  Shoulder Flex: 20x in 4' with bracing Open paddles  Shoulder Ext: 20x in 4' with bracing Open paddles  Shoulder ABD/ADD: 20x in 4' with bracing Open paddles  Shoulder Horiz ABD/ADD: 20x in 4' with bracing Open paddles  Rows: 20x holding 2-3 seconds in 4' with bracing. Posterior shoulder rolls 15x with bracing. Push ups on bar x 15        Static Strengthening LEs  Heel/Toe Raises: 20x each in 4' with bracing  Squats: 20 x  in 4' with bracing  Marchin x in 4' with bracing  Hamstring Curls: 20 x bilateral in 4' with bracing  4-Way Hip: 3-way hip 20x bilateral in 4' with bracing - cues for smaller range of motion   Dynamic Strengthening LE  Step-Ups (F,L,R): forward and lateral x 10 bilateral  Deep H2O LE  Bike: with noodle in 4'10\" x3 minutes with bracing ( only had time for 2 minutes today)  Hang: with noodle in 4'10\" x 3 minutes - no pain during hang, ankle circles x10 each direction      Activity Tolerance:  Activity Tolerance: Patient Tolerated treatment well    Assessment:  Assessment: Patient independent with pool program and has joined a local pool. Will finish remaining visits and discharge from PT. Prognosis: Good       Patient Education:  Patient Education: Monitor response to progressions made and continue with bracing throughout the day.                        Plan:  Times per week: 2x/week  Plan weeks: 8 weeks  Specific instructions for Next Treatment: Aquatic therapy for lower back and BLEs - deep water unloading, seated piriformis and hamstring stretching, gastroc stretching off step, BLE and UE strength  Current Treatment Recommendations: Strengthening, ROM, Balance Training, Gait Training, Stair training, Functional Mobility Training, Manual Therapy - Soft Tissue Mobilization, Manual Therapy - Joint Manipulation, Home Exercise Program, Modalities    Goals:       Short term goals  Time

## 2018-10-09 ENCOUNTER — HOSPITAL ENCOUNTER (OUTPATIENT)
Dept: PHYSICAL THERAPY | Age: 53
Setting detail: THERAPIES SERIES
Discharge: HOME | End: 2018-10-09
Payer: COMMERCIAL

## 2018-10-09 NOTE — PROGRESS NOTES
[facility]  PHYSICAL THERAPY MISSED TREATMENT NOTE  OUTPATIENT REHABILITATION    Date: 10/9/2018  Patient Name: Carmen Cabrera        MRN: [id number]   : 1965  (48 y.o.)  Gender: female           PT Visit Information  No Show: 1    REASON FOR MISSED TREATMENT:  Pt no show/no call for appointment today. Called pt and left message informing pt of next appointment time and date.       Junior Roth PTA [id number]

## 2018-10-11 ENCOUNTER — HOSPITAL ENCOUNTER (OUTPATIENT)
Dept: PHYSICAL THERAPY | Age: 53
Setting detail: THERAPIES SERIES
Discharge: HOME | End: 2018-10-11
Payer: COMMERCIAL

## 2018-10-11 PROCEDURE — G8992 OTHER PT/OT  D/C STATUS: HCPCS

## 2018-10-11 PROCEDURE — G8991 OTHER PT/OT GOAL STATUS: HCPCS

## 2018-10-11 PROCEDURE — 97110 THERAPEUTIC EXERCISES: CPT

## 2018-10-11 ASSESSMENT — PAIN DESCRIPTION - ORIENTATION: ORIENTATION: RIGHT

## 2018-10-11 ASSESSMENT — PAIN DESCRIPTION - PAIN TYPE: TYPE: CHRONIC PAIN

## 2018-10-11 ASSESSMENT — PAIN SCALES - GENERAL: PAINLEVEL_OUTOF10: 3

## 2018-10-11 ASSESSMENT — PAIN DESCRIPTION - LOCATION: LOCATION: BACK

## 2018-10-11 NOTE — DISCHARGE SUMMARY
[address]     Time In: 2530  Time Out: 0995  Minutes: 38  Timed Code Treatment Minutes: 38 Minutes     Date: 10/11/2018  Patient Name: Saul Paredes,  Gender:  female        CSN: [id number]   : 1965  (48 y.o.)  Referral Date : 18    Referring Practitioner: Pantera Oropeza MD      Diagnosis: G19.767 (ICD-10-CM) - Spinal stenosis, lumbar region with neurogenic claudication  Treatment Diagnosis: Lumbago, spinal stiffness, B hip stiffness, muscular weakness, numbness   Additional Pertinent Hx: Hernia repair, Bowel resection and muscle replacement , Lupus, Sjogrens syndrome, Reynaud's syndrome, chronic arthritis. General:  PT Visit Information  Onset Date: 18  PT Insurance Information: UMR - Secondary Medicare ([place] [id number]) -Aquatic and modalities covered. Precert YES THROUGH [employer]. WE HAVE AUTHORIZATION FOR 12 VISITS FROM 18-18 FOR CPT CODES 20692, [id number], 54170, [id number], [id number] AND ONE VISIT FOR 26174, 455 1011, 26598   Total # of Visits Approved: 12  Total # of Visits to Date: 6  Plan of Care/Certification Expiration Date: 10/18/18  Progress Note Counter:  for PN. Subjective:  Comments: Physician follow up: Dr. Brianda Gomes (pain management), meeting with back surgeon   Other (Comment): Aquatherapy for core abd strengthening prior to spine surgery     Subjective: Patient continues to report mild pain in midline lower back. When her pain is flared up she will also notice popping in her hips while walking. She has joined a local [employer] pool and purchased pool equipment such as [employer]. She notices that it is easier to get up off the floor, able to babysit her grandchildren easier, standing longer durations for cooking.       Pain:  Patient Currently in Pain: Yes  Pain Assessment: 0-10  Pain Level: 3  Pain Type: Chronic pain  Pain Location: Back  Pain Orientation:

## 2019-03-01 ENCOUNTER — HOSPITAL ENCOUNTER (EMERGENCY)
Age: 54
Discharge: HOME | End: 2019-03-01
Payer: COMMERCIAL

## 2019-03-01 VITALS
SYSTOLIC BLOOD PRESSURE: 133 MMHG | HEIGHT: 64 IN | DIASTOLIC BLOOD PRESSURE: 83 MMHG | BODY MASS INDEX: 35.71 KG/M2 | RESPIRATION RATE: 16 BRPM | OXYGEN SATURATION: 97 % | HEART RATE: 95 BPM | WEIGHT: 209.2 LBS | TEMPERATURE: 97.7 F

## 2019-03-01 DIAGNOSIS — R30.0 DYSURIA: ICD-10-CM

## 2019-03-01 DIAGNOSIS — J06.9 UPPER RESPIRATORY TRACT INFECTION, UNSPECIFIED TYPE: Primary | ICD-10-CM

## 2019-03-01 LAB
BILIRUBIN URINE: NEGATIVE
BLOOD, URINE: NEGATIVE
CHARACTER, URINE: CLEAR
COLOR: YELLOW
GLUCOSE, URINE: NEGATIVE MG/DL
KETONES, URINE: NEGATIVE
LEUKOCYTES, UA: NEGATIVE
NITRATE, UA: NEGATIVE
PH UA: 7 (ref 5–9)
PROTEIN UA: NEGATIVE MG/DL
REFLEX TO URINE C & S: NORMAL
SPECIFIC GRAVITY UA: 1.01 (ref 1–1.03)
UROBILINOGEN, URINE: 0.2 EU/DL (ref 0–1)

## 2019-03-01 PROCEDURE — 99213 OFFICE O/P EST LOW 20 MIN: CPT | Performed by: NURSE PRACTITIONER

## 2019-03-01 PROCEDURE — 81003 URINALYSIS AUTO W/O SCOPE: CPT

## 2019-03-01 PROCEDURE — 99214 OFFICE O/P EST MOD 30 MIN: CPT

## 2019-03-01 RX ORDER — PREDNISONE 20 MG/1
40 TABLET ORAL DAILY
Qty: 14 TABLET | Refills: 0 | Status: SHIPPED | OUTPATIENT
Start: 2019-03-01 | End: 2019-03-08

## 2019-03-01 RX ORDER — PHENAZOPYRIDINE HYDROCHLORIDE 100 MG/1
100 TABLET, FILM COATED ORAL 3 TIMES DAILY PRN
COMMUNITY
End: 2021-11-29 | Stop reason: CLARIF

## 2019-03-01 RX ORDER — BENZONATATE 100 MG/1
100 CAPSULE ORAL 3 TIMES DAILY PRN
Qty: 21 CAPSULE | Refills: 0 | Status: SHIPPED | OUTPATIENT
Start: 2019-03-01 | End: 2019-03-08

## 2019-03-01 RX ORDER — FLUCONAZOLE 150 MG/1
150 TABLET ORAL ONCE
Qty: 1 TABLET | Refills: 0 | Status: SHIPPED | OUTPATIENT
Start: 2019-03-01 | End: 2019-03-01

## 2019-03-01 RX ORDER — NITROFURANTOIN 25; 75 MG/1; MG/1
100 CAPSULE ORAL 2 TIMES DAILY
COMMUNITY
End: 2021-11-29 | Stop reason: CLARIF

## 2019-03-01 ASSESSMENT — ENCOUNTER SYMPTOMS
COUGH: 1
VOMITING: 0
ABDOMINAL PAIN: 0
SHORTNESS OF BREATH: 0

## 2019-03-01 ASSESSMENT — PAIN DESCRIPTION - ORIENTATION: ORIENTATION: LOWER

## 2019-03-01 ASSESSMENT — PAIN DESCRIPTION - LOCATION: LOCATION: ABDOMEN

## 2019-03-01 ASSESSMENT — PAIN DESCRIPTION - PAIN TYPE: TYPE: ACUTE PAIN

## 2019-03-01 ASSESSMENT — PAIN DESCRIPTION - DESCRIPTORS: DESCRIPTORS: DISCOMFORT

## 2019-03-01 ASSESSMENT — PAIN SCALES - GENERAL: PAINLEVEL_OUTOF10: 1

## 2019-03-01 ASSESSMENT — PAIN - FUNCTIONAL ASSESSMENT: PAIN_FUNCTIONAL_ASSESSMENT: ACTIVITIES ARE NOT PREVENTED

## 2021-11-29 ENCOUNTER — OFFICE VISIT (OUTPATIENT)
Dept: CARDIOLOGY CLINIC | Age: 56
End: 2021-11-29
Payer: COMMERCIAL

## 2021-11-29 VITALS
SYSTOLIC BLOOD PRESSURE: 160 MMHG | WEIGHT: 207 LBS | HEIGHT: 64 IN | DIASTOLIC BLOOD PRESSURE: 90 MMHG | HEART RATE: 80 BPM | BODY MASS INDEX: 35.34 KG/M2

## 2021-11-29 DIAGNOSIS — R00.2 HEART PALPITATIONS: ICD-10-CM

## 2021-11-29 DIAGNOSIS — R06.02 SOB (SHORTNESS OF BREATH) ON EXERTION: Primary | ICD-10-CM

## 2021-11-29 DIAGNOSIS — R94.31 ABNORMAL EKG: ICD-10-CM

## 2021-11-29 PROCEDURE — 93000 ELECTROCARDIOGRAM COMPLETE: CPT | Performed by: INTERNAL MEDICINE

## 2021-11-29 PROCEDURE — 99204 OFFICE O/P NEW MOD 45 MIN: CPT | Performed by: INTERNAL MEDICINE

## 2021-11-29 RX ORDER — AMLODIPINE BESYLATE 5 MG/1
5 TABLET ORAL DAILY PRN
Qty: 90 TABLET | Refills: 1 | Status: SHIPPED | OUTPATIENT
Start: 2021-11-29 | End: 2021-12-20

## 2021-11-29 RX ORDER — ONDANSETRON 4 MG/1
4 TABLET, FILM COATED ORAL EVERY 8 HOURS PRN
COMMUNITY

## 2021-11-29 NOTE — PROGRESS NOTES
[id number] [address] [facility] [address]  Dept: [phone]  Dept Fax: [phone]  Loc: [phone]    Visit Date: 11/29/2021    Ms. Roselind Severe is a 64 y.o. female  who presented for:  Chief Complaint   Patient presents with    New Patient    Hypertension       HPI:   64year old female with hx of open heart surgery in childhood for severe pulmonary stenosis, rheumatoid arthritis, lupus, Sjogren's syndrome, and hypertension presents to establish care and control BP. She admits to shortness of breath upon exertion. Some of her medications are currently held until her BP is under control. She also takes PRN medications for migraines. When she went to [facility] for an appointment last Tuesday before thanksgiving, manual blood pressure read 220/193. Took is again before she left and it was 190/160s, also manual because machines could not read it. States she is typically around 19/32 with systolic reaching 377 at the highest. Has had massive ehadaches lately that are more regular and unlike the ones she gets with lupus. EKG done in [facility] was concerning. QT interval was high? Possible Peft sided enlargement and concerned that there was a prior infarct. She denies chest pain, palpitations, lightheadedness, dizziness, PND, or pedal edema.          Current Outpatient Medications:     SUMATRIPTAN SUCCINATE PO, Take 25 mg by mouth once as needed for Migraine, Disp: , Rfl:     ondansetron (ZOFRAN) 4 MG tablet, Take 4 mg by mouth every 8 hours as needed for Nausea or Vomiting, Disp: , Rfl:     amLODIPine (NORVASC) 5 MG tablet, Take 1 tablet by mouth daily as needed (htn), Disp: 90 tablet, Rfl: 1    naproxen (NAPROSYN) 500 MG tablet, Take 1 tablet by mouth 2 times daily (with meals), Disp: 30 tablet, Rfl: 0    loratadine (CLARITIN) 10 MG tablet, Take 1 tablet by mouth daily, Disp: 15 tablet, Rfl: 0    esomeprazole Magnesium (NEXIUM) 40 MG PACK, Take 40 mg by mouth daily, Disp: , Rfl:     acetaminophen (TYLENOL) 325 MG tablet, Take 650 mg by mouth every 6 hours as needed for Pain , Disp: , Rfl:     cyclobenzaprine (FLEXERIL) 10 MG tablet, Take 1 tablet by mouth 3 times daily as needed for Muscle spasms . Do not drive or operate heavy machinery while taking this medication. (Patient not taking: Reported on 11/29/2021), Disp: 15 tablet, Rfl: 0    hydroxychloroquine (PLAQUENIL) 200 MG tablet, Take by mouth daily Once daily except tues thurs sat is twice a day (Patient not taking: Reported on 11/29/2021), Disp: , Rfl:     cevimeline (EVOXAC) 30 MG capsule, Take 30 mg by mouth 4 times daily (Patient not taking: Reported on 11/29/2021), Disp: , Rfl:     Past Medical History  Halima Cornelius  has a past medical history of Arthritis, GERD (gastroesophageal reflux disease), Hypertension, Lupus ([facility] [address] Raynaud disease, and Sjogren's syndrome ([facility] [address] Social History  Brielle  reports that she quit smoking about 2 years ago. Her smoking use included cigarettes. She smoked 0.50 packs per day. She has never used smokeless tobacco. She reports that she does not drink alcohol and does not use drugs. Family History  Halima Cornelius family history includes Arthritis in her mother; Cancer in her father. Past Surgical History   Past Surgical History:   Procedure Laterality Date    ABDOMEN SURGERY      ADENOIDECTOMY      APPENDECTOMY      BACK SURGERY      CARDIAC SURGERY      CHOLECYSTECTOMY      FOOT SURGERY      HERNIA REPAIR      HYSTERECTOMY      TONSILLECTOMY         Subjective:     REVIEW OF SYSTEMS  Constitutional: denies sweats, chills and fever  HENT: denies  congestion, sinus pressure, sneezing and sore throat. Eyes: denies  pain, discharge, redness and itching. Respiratory: denies apnea, cough  Gastrointestinal: denies blood in stool, constipation, diarrhea   Endocrine: denies cold intolerance, heat intolerance, polydipsia.   Genitourinary: denies dysuria, enuresis, flank pain and hematuria. Musculoskeletal: denies arthralgias, joint swelling and neck pain. Neurological: denies numbness and headaches. Psychiatric/Behavioral: denies agitation, confusion, decreased concentration and dysphoric mood    All others reviewed and are negative. Objective:     BP (!) 160/84   Pulse 80   Ht 5' 4\" (1.626 m)   Wt 207 lb (93.9 kg)   BMI 35.53 kg/m²     Wt Readings from Last 3 Encounters:   11/29/21 207 lb (93.9 kg)   03/01/19 209 lb 3.2 oz (94.9 kg)   07/09/18 212 lb (96.2 kg)     BP Readings from Last 3 Encounters:   11/29/21 (!) 160/84   03/01/19 133/83   07/09/18 134/84       PHYSICAL EXAM  Constitutional: Oriented to person, place, and time. Appears well-developed and well-nourished. HENT:   Head: Normocephalic and atraumatic. Eyes: EOM are normal. Pupils are equal, round, and reactive to light. Neck: Normal range of motion. Neck supple. No JVD present. Cardiovascular: Normal rate , normal heart sounds and intact distal pulses. Pulmonary/Chest: Effort normal and breath sounds normal. No respiratory distress. No wheezes. No rales. Abdominal: Soft. Bowel sounds are normal. No distension. There is no tenderness. Musculoskeletal: Normal range of motion. No edema. Neurological: Alert and oriented to person, place, and time. No cranial nerve deficit. Coordination normal.   Skin: Skin is warm and dry. Psychiatric: Normal mood and affect.        No results found for: CKTOTAL, CKMB, CKMBINDEX    Lab Results   Component Value Date    HCT 43.8 08/09/2011       No results found for: NA, K, CL, CO2, BUN, LABALBU, CREATININE, CALCIUM, GFRAA, LABGLOM, GLUCOSE    No results found for: ALKPHOS, ALT, AST, PROT, BILITOT, BILIDIR, IBILI, LABALBU    No results found for: MG    No results found for: INR, PROTIME      No results found for: LABA1C    No results found for: TRIG, HDL, LDLCALC, LDLDIRECT, LABVLDL    No results found for: TSH      Testing Reviewed: I haveindividually reviewed the below cardiac tests    EKG:    ECHO: No results found for this or any previous visit. STRESS:    CATH:    Assessment/Plan      Hypertension  Lupus  Sjogren's syndrome  Raynaud's disease  GERD    Has a lot of pain due to joints  Was told that high BP was due to joint pains at [facility] from [place] were reviewed  States she lost 50 lbs in last 3-4 months without trying  Will give amlodipine 5mg po prn for high BP  Will first get Echo and follow up  The patient is asked to make an attempt to improve diet and exercise patterns to aid in medical management of this problem. Advised patient to call office or seek immediate medical attention if there is any new onset of  any chest pain, sob, palpitations, lightheadedness, dizziness, orthopnea, PND or pedal edema. All medication side effects were discussed in details. Thank you for allowing me to participate in the care of this patient. Please do not hesitate to contact me for any further questions. Return in about 2 weeks (around 12/13/2021), or if symptoms worsen or fail to improve, for Regular follow up, Review testing.        Electronically signed by Daksha Liu MD [facility]  11/29/2021 at 3:58 PM EST

## 2021-12-13 ENCOUNTER — HOSPITAL ENCOUNTER (OUTPATIENT)
Dept: NON INVASIVE DIAGNOSTICS | Age: 56
Discharge: HOME | End: 2021-12-13
Payer: COMMERCIAL

## 2021-12-13 DIAGNOSIS — R06.02 SOB (SHORTNESS OF BREATH) ON EXERTION: ICD-10-CM

## 2021-12-13 DIAGNOSIS — R94.31 ABNORMAL EKG: ICD-10-CM

## 2021-12-13 DIAGNOSIS — R00.2 HEART PALPITATIONS: ICD-10-CM

## 2021-12-13 LAB
LV EF: 55 %
LVEF MODALITY: NORMAL

## 2021-12-13 PROCEDURE — 93306 TTE W/DOPPLER COMPLETE: CPT

## 2021-12-20 ENCOUNTER — OFFICE VISIT (OUTPATIENT)
Dept: CARDIOLOGY CLINIC | Age: 56
End: 2021-12-20
Payer: COMMERCIAL

## 2021-12-20 VITALS
HEIGHT: 64 IN | OXYGEN SATURATION: 97 % | BODY MASS INDEX: 34.66 KG/M2 | HEART RATE: 78 BPM | WEIGHT: 203 LBS | SYSTOLIC BLOOD PRESSURE: 170 MMHG | DIASTOLIC BLOOD PRESSURE: 108 MMHG

## 2021-12-20 DIAGNOSIS — I10 HYPERTENSION, UNSPECIFIED TYPE: Primary | ICD-10-CM

## 2021-12-20 PROCEDURE — 99214 OFFICE O/P EST MOD 30 MIN: CPT | Performed by: INTERNAL MEDICINE

## 2021-12-20 RX ORDER — AMLODIPINE BESYLATE 10 MG/1
10 TABLET ORAL DAILY PRN
Qty: 30 TABLET | Refills: 3 | Status: SHIPPED | OUTPATIENT
Start: 2021-12-20 | End: 2021-12-28 | Stop reason: SDUPTHER

## 2021-12-20 NOTE — PROGRESS NOTES
[address] [facility] [place] [address]  [facility] [address]  Dept: [phone]  Dept Fax: [phone]  Loc: [phone]    Visit Date: 12/20/2021    Ms. Isaura Howard is a 64 y.o. female  who presented for:  Chief Complaint   Patient presents with    Follow-up       HPI:   64year old female with hx of open heart surgery in childhood for severe pulmonary stenosis, rheumatoid arthritis, lupus, Sjogren's syndrome, and hypertension presents to establish care and control BP. She admits to shortness of breath upon exertion. Some of her medications are currently held until her BP is under control. She also takes PRN medications for migraines. When she went to [facility] for an appointment last Tuesday before thanksgiving, manual blood pressure read 220/193. Took is again before she left and it was 190/160s, also manual because machines could not read it. States she is typically around 99/97 with systolic reaching 704 at the highest. Has had massive ehadaches lately that are more regular and unlike the ones she gets with lupus. EKG done in [facility] was concerning. QT interval was high? Possible Peft sided enlargement and concerned that there was a prior infarct. She denies chest pain, palpitations, lightheadedness, dizziness, PND, or pedal edema. She underwent echo and is here for a follow up      Current Outpatient Medications:     amLODIPine (NORVASC) 10 MG tablet, Take 1 tablet by mouth daily as needed (htn), Disp: 30 tablet, Rfl: 3    SUMATRIPTAN SUCCINATE PO, Take 25 mg by mouth once as needed for Migraine, Disp: , Rfl:     ondansetron (ZOFRAN) 4 MG tablet, Take 4 mg by mouth every 8 hours as needed for Nausea or Vomiting, Disp: , Rfl:     cyclobenzaprine (FLEXERIL) 10 MG tablet, Take 1 tablet by mouth 3 times daily as needed for Muscle spasms . Do not drive or operate heavy machinery while taking this medication. , Disp: 15 tablet, Rfl: 0   naproxen (NAPROSYN) 500 MG tablet, Take 1 tablet by mouth 2 times daily (with meals), Disp: 30 tablet, Rfl: 0    loratadine (CLARITIN) 10 MG tablet, Take 1 tablet by mouth daily, Disp: 15 tablet, Rfl: 0    esomeprazole Magnesium (NEXIUM) 40 MG PACK, Take 40 mg by mouth daily, Disp: , Rfl:     hydroxychloroquine (PLAQUENIL) 200 MG tablet, Take by mouth daily Once daily except tues thurs sat is twice a day, Disp: , Rfl:     cevimeline (EVOXAC) 30 MG capsule, Take 30 mg by mouth 4 times daily , Disp: , Rfl:     acetaminophen (TYLENOL) 325 MG tablet, Take 650 mg by mouth every 6 hours as needed for Pain , Disp: , Rfl:     Past Medical History  Faraz Bond  has a past medical history of Arthritis, GERD (gastroesophageal reflux disease), Hypertension, Lupus ([facility] [address] Raynaud disease, and Sjogren's syndrome ([facility] [address] Social History  Brielle  reports that she quit smoking about 2 years ago. Her smoking use included cigarettes. She smoked 0.50 packs per day. She has never used smokeless tobacco. She reports that she does not drink alcohol and does not use drugs. Family History  Faraz Bond family history includes Arthritis in her mother; Cancer in her father. Past Surgical History   Past Surgical History:   Procedure Laterality Date    ABDOMEN SURGERY      ADENOIDECTOMY      APPENDECTOMY      BACK SURGERY      CARDIAC SURGERY      CHOLECYSTECTOMY      FOOT SURGERY      HERNIA REPAIR      HYSTERECTOMY      TONSILLECTOMY         Subjective:     REVIEW OF SYSTEMS  Constitutional: denies sweats, chills and fever  HENT: denies  congestion, sinus pressure, sneezing and sore throat. Eyes: denies  pain, discharge, redness and itching. Respiratory: denies apnea, cough  Gastrointestinal: denies blood in stool, constipation, diarrhea   Endocrine: denies cold intolerance, heat intolerance, polydipsia. Genitourinary: denies dysuria, enuresis, flank pain and hematuria.    Musculoskeletal: denies arthralgias, joint swelling and neck pain. Neurological: denies numbness and headaches. Psychiatric/Behavioral: denies agitation, confusion, decreased concentration and dysphoric mood    All others reviewed and are negative. Objective:     BP (!) 170/108 Comment: home machine  Pulse 78   Ht 5' 4\" (1.626 m)   Wt 203 lb (92.1 kg)   SpO2 97%   BMI 34.84 kg/m²     Wt Readings from Last 3 Encounters:   12/20/21 203 lb (92.1 kg)   11/29/21 207 lb (93.9 kg)   03/01/19 209 lb 3.2 oz (94.9 kg)     BP Readings from Last 3 Encounters:   12/20/21 (!) 170/108   11/29/21 (!) 160/90   03/01/19 133/83       PHYSICAL EXAM  Constitutional: Oriented to person, place, and time. Appears well-developed and well-nourished. HENT:   Head: Normocephalic and atraumatic. Eyes: EOM are normal. Pupils are equal, round, and reactive to light. Neck: Normal range of motion. Neck supple. No JVD present. Cardiovascular: Normal rate , normal heart sounds and intact distal pulses. Pulmonary/Chest: Effort normal and breath sounds normal. No respiratory distress. No wheezes. No rales. Abdominal: Soft. Bowel sounds are normal. No distension. There is no tenderness. Musculoskeletal: Normal range of motion. No edema. Neurological: Alert and oriented to person, place, and time. No cranial nerve deficit. Coordination normal.   Skin: Skin is warm and dry. Psychiatric: Normal mood and affect.        No results found for: CKTOTAL, CKMB, CKMBINDEX    Lab Results   Component Value Date    HCT 43.8 08/09/2011       No results found for: NA, K, CL, CO2, BUN, LABALBU, CREATININE, CALCIUM, GFRAA, LABGLOM, GLUCOSE    No results found for: ALKPHOS, ALT, AST, PROT, BILITOT, BILIDIR, IBILI, LABALBU    No results found for: MG    No results found for: INR, PROTIME      No results found for: LABA1C    No results found for: TRIG, HDL, LDLCALC, LDLDIRECT, LABVLDL    No results found for: TSH      Testing Reviewed:      I haveindividually reviewed the below cardiac tests    EKG:    ECHO: Results for orders placed during the hospital encounter of 12/13/21    ECHO Complete 2D W Doppler W Color    Narrative  Transthoracic Echocardiography Report (TTE)    Demographics    Patient Name    Brian Reed  Gender               Female  JOYCE    MR #            [id number]      Race                     Ethnicity    Account #       [de-identified]      Room Number    Accession       [id number]     Date of Study        12/13/2021  Number    Date of Birth   1965     Referring Physician  Clarita Hamman MD    Age             64 year(s)     Sonographer          Brendan Boyd, MAT,  RVT    Interpreting         Echo reader of the  Physician            myron Babcock MD    Procedure    Type of Study    TTE procedure:ECHOCARDIOGRAM COMPLETE 2D W DOPPLER W COLOR. Procedure Date  Date: 12/13/2021 Start: 07:53 AM    Study Location: Echo Lab  Technical Quality: Adequate visualization    Indications:Shortness of breath. Additional Medical History:GERD, Arthritis, Hypertension, Lupus, Raynauds  disease, Sjogrens syndrome, Former smoker, Rheumatoid arthritis. Patient Status: Routine    Height: 64 inches Weight: 207 pounds BSA: 1.98 m^2 BMI: 35.53 kg/m^2    BP: 160/90 mmHg    Conclusions    Summary  Ejection fraction is visually estimated at 55%. Overall left ventricular function is normal.  The aortic valve leaflets were not well visualized. Aortic valve appears tricuspid. Aortic valve leaflets are somewhat thickened. Aortic valve leaflets are Mildly calcified. Mild-to-moderate aortic regurgitation is noted. Signature    ----------------------------------------------------------------  Electronically signed by Sotero Babcock MD (Interpreting  physician) on 12/15/2021 at 07:42 PM  ----------------------------------------------------------------    Findings    Mitral Valve  The mitral valve structure was normal with normal leaflet separation.   DOPPLER: The transmitral velocity was within the normal range with no  evidence for mitral stenosis. There was no evidence of mitral  regurgitation. Aortic Valve  The aortic valve leaflets were not well visualized. Aortic valve appears tricuspid. Aortic valve leaflets are somewhat thickened. Aortic valve leaflets are Mildly calcified. Mild-to-moderate aortic regurgitation is noted. Tricuspid Valve  Trivial tricuspid regurgitation visualized. Pulmonic Valve  The pulmonic valve was not well visualized . Trivial pulmonic regurgitation visualized. Left Atrium  Left atrial size was normal.    Left Ventricle  Ejection fraction is visually estimated at 55%. Overall left ventricular function is normal.    Right Atrium  Right atrial size was normal.    Right Ventricle  The right ventricular size was normal with normal systolic function and  wall thickness. Pericardial Effusion  The pericardium was normal in appearance with no evidence of a pericardial  effusion. Pleural Effusion  No evidence of pleural effusion. Aorta / Great Vessels  -Aortic root dimension within normal limits.  -The Pulmonary artery is within normal limits. -IVC size is within normal limits with normal respiratory phasic changes.     M-Mode/2D Measurements & Calculations    LV Diastolic    LV Systolic Dimension: 3  AV Cusp Separation: 2 cmLA  Dimension: 4.2  cm                        Dimension: 3.2 cmAO Root  cm              LV Volume Diastolic: 79.8 Dimension: 2.9 cmLA Area: 12.2  LV FS:28.6 %    ml                        cm^2  LV PW           LV Volume Systolic: 35 ml  Diastolic: 1 cm LV EDV/LV EDV Index: 78.6  Septum          ml/40 m^2LV ESV/LV ESV  Diastolic: 1.3  Index: 35 DY/84 m^2       RV Diastolic Dimension: 2.7 cm  cm              EF Calculated: 55.5 %  LA/Aorta: 1.1    LA volume/Index: 25.7 ml /13m^2    Doppler Measurements & Calculations    MV Peak E-Wave: 60.2 cm/s  AV Peak Velocity: 147 LVOT Peak Velocity: 116  MV Peak A-Wave: 93 cm/s    cm/s                  cm/s  MV E/A Ratio: 0.65         AV Peak Gradient:     LVOT Peak Gradient: 5  MV Peak Gradient: 1.45     8.64 mmHg             mmHg  mmHg  TV Peak E-Wave: 48.7 cm/s  MV Deceleration Time: 203                        TV Peak A-Wave: 52.2 cm/s  msec  AV P1/2t: 539 msec    TV Peak Gradient: 0.95  IVRT: 79 msec         mmHg  MV E' Septal Velocity: 6.7                       TR Velocity:271 cm/s  cm/s                                             TR Gradient:29.38 mmHg  MV A' Septal Velocity:     AV DVI (Vmax):0.79    PV Peak Velocity: 120  11.6 cm/s                                        cm/s  MV E' Lateral Velocity:                          PV Peak Gradient: 5.76  7.9 cm/s                                         mmHg  MV A' Lateral Velocity:  11.9 cm/s  E/E' septal: 8.99  E/E' lateral: 7.62  MR Velocity: 523 cm/s    [id number]      STRESS:    CATH:    Assessment/Plan       Diagnosis Orders   1. Hypertension, unspecified type         Hypertension--uncontrolled  Lupus  Sjogren's syndrome  Raynaud's disease  GERD     Has a lot of pain due to joints  Was told that high BP was due to joint pains at [facility] from [place] were reviewed  States she lost 50 lbs in last 3-4 months without trying  Will start amlodipine 10mg daily  Reviewed Echo and follow up  The patient is asked to make an attempt to improve diet and exercise patterns to aid in medical management of this problem. Advised patient to call office or seek immediate medical attention if there is any new onset of  any chest pain, sob, palpitations, lightheadedness, dizziness, orthopnea, PND or pedal edema. All medication side effects were discussed in details. Return in about 3 months (around 3/20/2022), or if symptoms worsen or fail to improve, for Review testing, Regular follow up.        Electronically signed by Jean-Pierre Pelaez MD [facility]  12/20/2021 at 1:14 PM EST

## 2021-12-28 RX ORDER — AMLODIPINE BESYLATE 10 MG/1
10 TABLET ORAL DAILY PRN
Qty: 30 TABLET | Refills: 3 | Status: SHIPPED | OUTPATIENT
Start: 2021-12-28 | End: 2021-12-30 | Stop reason: SDUPTHER

## 2021-12-30 RX ORDER — AMLODIPINE BESYLATE 10 MG/1
10 TABLET ORAL DAILY PRN
Qty: 90 TABLET | Refills: 0 | Status: SHIPPED | OUTPATIENT
Start: 2021-12-30 | End: 2022-01-11

## 2022-01-12 RX ORDER — AMLODIPINE BESYLATE 10 MG/1
TABLET ORAL
Qty: 30 TABLET | Refills: 2 | Status: SHIPPED | OUTPATIENT
Start: 2022-01-12 | End: 2022-03-18 | Stop reason: SDUPTHER

## 2022-03-18 ENCOUNTER — OFFICE VISIT (OUTPATIENT)
Dept: CARDIOLOGY CLINIC | Age: 57
End: 2022-03-18
Payer: COMMERCIAL

## 2022-03-18 VITALS
SYSTOLIC BLOOD PRESSURE: 130 MMHG | HEART RATE: 76 BPM | HEIGHT: 64 IN | DIASTOLIC BLOOD PRESSURE: 77 MMHG | BODY MASS INDEX: 33.46 KG/M2 | WEIGHT: 196 LBS

## 2022-03-18 DIAGNOSIS — I10 HYPERTENSION, UNSPECIFIED TYPE: Primary | ICD-10-CM

## 2022-03-18 PROCEDURE — 99213 OFFICE O/P EST LOW 20 MIN: CPT | Performed by: INTERNAL MEDICINE

## 2022-03-18 RX ORDER — CALCIUM CARBONATE 750 MG/1
750 TABLET, CHEWABLE ORAL PRN
COMMUNITY

## 2022-03-18 RX ORDER — ERGOCALCIFEROL (VITAMIN D2) 1250 MCG
50000 CAPSULE ORAL
COMMUNITY
Start: 2021-12-01 | End: 2023-02-24

## 2022-03-18 RX ORDER — ETANERCEPT 50 MG/ML
50 SOLUTION SUBCUTANEOUS
COMMUNITY
Start: 2021-12-01 | End: 2023-02-24

## 2022-03-18 NOTE — PROGRESS NOTES
[facility] [address]  [place] [address]  Dept: [phone]  Dept Fax: [phone]  Loc: [phone]    Visit Date: 3/18/2022    Ms. Claudell Gilmore is a 64 y.o. female  who presented for:  Chief Complaint   Patient presents with    3 Month Follow-Up       HPI:   64year old female with hx of open heart surgery in childhood for severe pulmonary stenosis, rheumatoid arthritis, lupus, Sjogren's syndrome, and hypertension presents to establish care and control BP. She admits to shortness of breath upon exertion. Some of her medications are currently held until her BP is under control. She also takes PRN medications for migraines. When she went to [facility] for an appointment last Tuesday before thanksgiving, manual blood pressure read 220/193. Took is again before she left and it was 190/160s, also manual because machines could not read it. States she is typically around 64/71 with systolic reaching 895 at the highest. Has had massive ehadaches lately that are more regular and unlike the ones she gets with lupus. EKG done in [facility] was concerning. QT interval was high? Possible Peft sided enlargement and concerned that there was a prior infarct. She denies chest pain, palpitations, lightheadedness, dizziness, PND, or pedal edema. She underwent echo and is here for a follow up  Her BP has been better.   Today its 130/77      Current Outpatient Medications:     calcium carbonate (TUMS EX) 750 MG chewable tablet, Take 750 mg by mouth as needed, Disp: , Rfl:     diclofenac sodium (VOLTAREN) 1 % GEL, Apply 2 g topically 4 times daily, Disp: , Rfl:     ergocalciferol (ERGOCALCIFEROL) 1.25 MG (62577 UT) capsule, Take 50,000 Units by mouth, Disp: , Rfl:     amLODIPine (NORVASC) 10 MG tablet, TAKE 1 TABLET BY MOUTH DAILY AS NEEDED (BLOOD PRESSURE) (Patient taking differently: Take 10 mg by mouth daily ), Disp: 30 tablet, Rfl: 2   SUMATRIPTAN SUCCINATE PO, Take 25 mg by mouth once as needed for Migraine, Disp: , Rfl:     ondansetron (ZOFRAN) 4 MG tablet, Take 4 mg by mouth every 8 hours as needed for Nausea or Vomiting, Disp: , Rfl:     cyclobenzaprine (FLEXERIL) 10 MG tablet, Take 1 tablet by mouth 3 times daily as needed for Muscle spasms . Do not drive or operate heavy machinery while taking this medication. , Disp: 15 tablet, Rfl: 0    naproxen (NAPROSYN) 500 MG tablet, Take 1 tablet by mouth 2 times daily (with meals), Disp: 30 tablet, Rfl: 0    loratadine (CLARITIN) 10 MG tablet, Take 1 tablet by mouth daily, Disp: 15 tablet, Rfl: 0    esomeprazole Magnesium (NEXIUM) 40 MG PACK, Take 40 mg by mouth daily, Disp: , Rfl:     acetaminophen (TYLENOL) 325 MG tablet, Take 650 mg by mouth every 6 hours as needed for Pain , Disp: , Rfl:     Etanercept (ENBREL MINI) 50 MG/ML SOCT, Inject 50 mg into the skin (Patient not taking: Reported on 3/18/2022), Disp: , Rfl:     hydroxychloroquine (PLAQUENIL) 200 MG tablet, Take by mouth daily Once daily except tues thurs sat is twice a day (Patient not taking: Reported on 3/18/2022), Disp: , Rfl:     cevimeline (EVOXAC) 30 MG capsule, Take 30 mg by mouth 4 times daily  (Patient not taking: Reported on 3/18/2022), Disp: , Rfl:     Past Medical History  Mayra Biswas  has a past medical history of Arthritis, GERD (gastroesophageal reflux disease), Hypertension, Lupus ([address] Raynaud disease, and Sjogren's syndrome ([facility] [address] Social History  Brielle  reports that she quit smoking about 3 years ago. Her smoking use included cigarettes. She smoked 0.50 packs per day. She has never used smokeless tobacco. She reports that she does not drink alcohol and does not use drugs. Family History  Mayra Biswas family history includes Arthritis in her mother; Cancer in her father.     Past Surgical History   Past Surgical History:   Procedure Laterality Date    ABDOMEN SURGERY      ADENOIDECTOMY      APPENDECTOMY      BACK SURGERY      CARDIAC SURGERY      CHOLECYSTECTOMY      FOOT SURGERY      HERNIA REPAIR      HYSTERECTOMY      TONSILLECTOMY         Subjective:     REVIEW OF SYSTEMS  Constitutional: denies sweats, chills and fever  HENT: denies  congestion, sinus pressure, sneezing and sore throat. Eyes: denies  pain, discharge, redness and itching. Respiratory: denies apnea, cough  Gastrointestinal: denies blood in stool, constipation, diarrhea   Endocrine: denies cold intolerance, heat intolerance, polydipsia. Genitourinary: denies dysuria, enuresis, flank pain and hematuria. Musculoskeletal: denies arthralgias, joint swelling and neck pain. Neurological: denies numbness and headaches. Psychiatric/Behavioral: denies agitation, confusion, decreased concentration and dysphoric mood    All others reviewed and are negative. Objective:     /77   Pulse 76   Ht 5' 4\" (1.626 m)   Wt 196 lb (88.9 kg)   BMI 33.64 kg/m²     Wt Readings from Last 3 Encounters:   03/18/22 196 lb (88.9 kg)   12/20/21 203 lb (92.1 kg)   11/29/21 207 lb (93.9 kg)     BP Readings from Last 3 Encounters:   03/18/22 130/77   12/20/21 (!) 170/108   11/29/21 (!) 160/90       PHYSICAL EXAM  Constitutional: Oriented to person, place, and time. Appears well-developed and well-nourished. HENT:   Head: Normocephalic and atraumatic. Eyes: EOM are normal. Pupils are equal, round, and reactive to light. Neck: Normal range of motion. Neck supple. No JVD present. Cardiovascular: Normal rate , normal heart sounds and intact distal pulses. Pulmonary/Chest: Effort normal and breath sounds normal. No respiratory distress. No wheezes. No rales. Abdominal: Soft. Bowel sounds are normal. No distension. There is no tenderness. Musculoskeletal: Normal range of motion. No edema. Neurological: Alert and oriented to person, place, and time. No cranial nerve deficit. Coordination normal.   Skin: Skin is warm and dry.    Psychiatric: Normal mood and affect. No results found for: CKTOTAL, CKMB, CKMBINDEX    Lab Results   Component Value Date    HCT 43.8 08/09/2011       No results found for: NA, K, CL, CO2, BUN, LABALBU, CREATININE, CALCIUM, GFRAA, LABGLOM, GLUCOSE, GLU    No results found for: ALKPHOS, ALT, AST, PROT, BILITOT, BILIDIR, IBILI, LABALBU    No results found for: MG    No results found for: INR, PROTIME      No results found for: LABA1C    No results found for: TRIG, HDL, LDLCALC, LDLDIRECT, LABVLDL    No results found for: TSH      Testing Reviewed:      I haveindividually reviewed the below cardiac tests    EKG:    ECHO: Results for orders placed during the hospital encounter of 12/13/21    ECHO Complete 2D W Doppler W Color    Narrative  Transthoracic Echocardiography Report (TTE)    Demographics    Patient Name    Kendra Eller  Gender               Female  J    MR #            [id number]      Race                     Ethnicity    Account #       [de-identified]      Room Number    Accession       [id number]     Date of Study        12/13/2021  Number    Date of Birth   1965     Referring Physician  Ayah Milian MD    Age             64 year(s)     Sonographer          Lidia Tanner RDCS,  RVT    Interpreting         Echo reader of the  Physician            myron Cisneros MD    Procedure    Type of Study    TTE procedure:ECHOCARDIOGRAM COMPLETE 2D W DOPPLER W COLOR. Procedure Date  Date: 12/13/2021 Start: 07:53 AM    Study Location: Echo Lab  Technical Quality: Adequate visualization    Indications:Shortness of breath. Additional Medical History:GERD, Arthritis, Hypertension, Lupus, Raynauds  disease, Sjogrens syndrome, Former smoker, Rheumatoid arthritis. Patient Status: Routine    Height: 64 inches Weight: 207 pounds BSA: 1.98 m^2 BMI: 35.53 kg/m^2    BP: 160/90 mmHg    Conclusions    Summary  Ejection fraction is visually estimated at 55%.   Overall left ventricular function is normal.  The aortic valve leaflets were not well visualized. Aortic valve appears tricuspid. Aortic valve leaflets are somewhat thickened. Aortic valve leaflets are Mildly calcified. Mild-to-moderate aortic regurgitation is noted. Signature    ----------------------------------------------------------------  Electronically signed by Nehal Dyson MD (Interpreting  physician) on 12/15/2021 at 07:42 PM  ----------------------------------------------------------------    Findings    Mitral Valve  The mitral valve structure was normal with normal leaflet separation. DOPPLER: The transmitral velocity was within the normal range with no  evidence for mitral stenosis. There was no evidence of mitral  regurgitation. Aortic Valve  The aortic valve leaflets were not well visualized. Aortic valve appears tricuspid. Aortic valve leaflets are somewhat thickened. Aortic valve leaflets are Mildly calcified. Mild-to-moderate aortic regurgitation is noted. Tricuspid Valve  Trivial tricuspid regurgitation visualized. Pulmonic Valve  The pulmonic valve was not well visualized . Trivial pulmonic regurgitation visualized. Left Atrium  Left atrial size was normal.    Left Ventricle  Ejection fraction is visually estimated at 55%. Overall left ventricular function is normal.    Right Atrium  Right atrial size was normal.    Right Ventricle  The right ventricular size was normal with normal systolic function and  wall thickness. Pericardial Effusion  The pericardium was normal in appearance with no evidence of a pericardial  effusion. Pleural Effusion  No evidence of pleural effusion. Aorta / Great Vessels  -Aortic root dimension within normal limits.  -The Pulmonary artery is within normal limits. -IVC size is within normal limits with normal respiratory phasic changes.     M-Mode/2D Measurements & Calculations    LV Diastolic    LV Systolic Dimension: 3  AV Cusp Separation: 2 cmLA  Dimension: 4.2  cm Dimension: 3.2 cmAO Root  cm              LV Volume Diastolic: 50.6 Dimension: 2.9 cmLA Area: 12.2  LV FS:28.6 %    ml                        cm^2  LV PW           LV Volume Systolic: 35 ml  Diastolic: 1 cm LV EDV/LV EDV Index: 78.6  Septum          ml/40 m^2LV ESV/LV ESV  Diastolic: 1.3  Index: 35 WW/53 m^2       RV Diastolic Dimension: 2.7 cm  cm              EF Calculated: 55.5 %  LA/Aorta: 1.1    LA volume/Index: 25.7 ml /13m^2    Doppler Measurements & Calculations    MV Peak E-Wave: 60.2 cm/s  AV Peak Velocity: 147 LVOT Peak Velocity: 116  MV Peak A-Wave: 93 cm/s    cm/s                  cm/s  MV E/A Ratio: 0.65         AV Peak Gradient:     LVOT Peak Gradient: 5  MV Peak Gradient: 1.45     8.64 mmHg             mmHg  mmHg  TV Peak E-Wave: 48.7 cm/s  MV Deceleration Time: 203                        TV Peak A-Wave: 52.2 cm/s  msec  AV P1/2t: 539 msec    TV Peak Gradient: 0.95  IVRT: 79 msec         mmHg  MV E' Septal Velocity: 6.7                       TR Velocity:271 cm/s  cm/s                                             TR Gradient:29.38 mmHg  MV A' Septal Velocity:     AV DVI (Vmax):0.79    PV Peak Velocity: 120  11.6 cm/s                                        cm/s  MV E' Lateral Velocity:                          PV Peak Gradient: 5.76  7.9 cm/s                                         mmHg  MV A' Lateral Velocity:  11.9 cm/s  E/E' septal: 8.99  E/E' lateral: 7.62  MR Velocity: 523 cm/s    [id number]      STRESS:    CATH:    Assessment/Plan       Diagnosis Orders   1.  Hypertension, unspecified type         Hypertension--improved  Lupus  Sjogren's syndrome  Raynaud's disease  GERD     Has a lot of pain due to joints  Was told that high BP was due to joint pains at [facility] from [place] were reviewed  States she lost 50 lbs in last 3-4 months without trying  On amlodipine 10mg daily  BP better today  Reviewed Echo and follow up  The patient is asked to make an attempt to improve diet and exercise patterns to aid in medical management of this problem. Advised patient to call office or seek immediate medical attention if there is any new onset of  any chest pain, sob, palpitations, lightheadedness, dizziness, orthopnea, PND or pedal edema. All medication side effects were discussed in details. Return if symptoms worsen or fail to improve, for Regular follow up, Review testing.        Electronically signed by Cleo Mariscal MD [facility]  3/18/2022 at 1:14 PM EST

## 2022-03-21 RX ORDER — AMLODIPINE BESYLATE 10 MG/1
10 TABLET ORAL DAILY
Qty: 90 TABLET | Refills: 3 | Status: SHIPPED | OUTPATIENT
Start: 2022-03-21 | End: 2022-03-28

## 2022-03-28 RX ORDER — AMLODIPINE BESYLATE 10 MG/1
TABLET ORAL
Qty: 90 TABLET | Refills: 3 | Status: SHIPPED | OUTPATIENT
Start: 2022-03-28

## 2023-01-19 RX ORDER — AMLODIPINE BESYLATE 10 MG/1
TABLET ORAL
Qty: 90 TABLET | Refills: 0 | Status: SHIPPED | OUTPATIENT
Start: 2023-01-19

## 2023-03-16 ENCOUNTER — OFFICE VISIT (OUTPATIENT)
Dept: CARDIOLOGY CLINIC | Age: 58
End: 2023-03-16
Payer: COMMERCIAL

## 2023-03-16 VITALS
HEART RATE: 97 BPM | HEIGHT: 64 IN | SYSTOLIC BLOOD PRESSURE: 124 MMHG | BODY MASS INDEX: 31.65 KG/M2 | WEIGHT: 185.38 LBS | DIASTOLIC BLOOD PRESSURE: 78 MMHG

## 2023-03-16 DIAGNOSIS — R07.9 CHEST PAIN IN ADULT: ICD-10-CM

## 2023-03-16 DIAGNOSIS — R06.02 SOB (SHORTNESS OF BREATH) ON EXERTION: Primary | ICD-10-CM

## 2023-03-16 PROCEDURE — 93000 ELECTROCARDIOGRAM COMPLETE: CPT | Performed by: INTERNAL MEDICINE

## 2023-03-16 PROCEDURE — 99213 OFFICE O/P EST LOW 20 MIN: CPT | Performed by: INTERNAL MEDICINE

## 2023-03-16 NOTE — PROGRESS NOTES
[facility] PHYSICIANS [place] [facility]  [facility] CARDIOLOGY  [address]  [address]  [place] [address]  Dept: [phone]  Dept Fax: [phone]  Loc: [phone]    Visit Date: 3/16/2023    Ms. Howard is a 57 y.o. female  who presented for:  Chief Complaint   Patient presents with    Check-Up    Hypertension       HPI:   57 year old female with hx of open heart surgery in childhood for severe pulmonary stenosis, rheumatoid arthritis, lupus, Sjogren's syndrome, and hypertension presents to establish care and control BP. She admits to shortness of breath upon exertion. Some of her medications are currently held until her BP is under control. She also takes PRN medications for migraines. When she went to [place] for an appointment last Tuesday before thanksgiving, manual blood pressure read 220/193. Took is again before she left and it was 190/160s, also manual because machines could not read it. States she is typically around 90/60 with systolic reaching 110 at the highest. Has had massive ehadaches lately that are more regular and unlike the ones she gets with lupus. EKG done in [facility] was concerning. QT interval was high? Possible Left sided enlargement and concerned that there was a prior infarct. She denies chest pain, palpitations, lightheadedness, dizziness, PND, or pedal edema.     She is here for a follow up.        Current Outpatient Medications:     Magnesium 100 MG CAPS, Take 200 mg by mouth daily, Disp: , Rfl:     amLODIPine (NORVASC) 10 MG tablet, TAKE 1 TABLET EVERY DAY, Disp: 90 tablet, Rfl: 0    calcium carbonate (TUMS EX) 750 MG chewable tablet, Take 750 mg by mouth as needed, Disp: , Rfl:     diclofenac sodium (VOLTAREN) 1 % GEL, Apply 2 g topically 4 times daily, Disp: , Rfl:     ergocalciferol (ERGOCALCIFEROL) 1.25 MG (04144 UT) capsule, Take 50,000 Units by mouth once a week, Disp: , Rfl:     SUMATRIPTAN SUCCINATE PO, Take 25 mg by mouth once as needed for Migraine,  Disp: , Rfl:     ondansetron (ZOFRAN) 4 MG tablet, Take 4 mg by mouth every 8 hours as needed for Nausea or Vomiting, Disp: , Rfl:     naproxen (NAPROSYN) 500 MG tablet, Take 1 tablet by mouth 2 times daily (with meals), Disp: 30 tablet, Rfl: 0    loratadine (CLARITIN) 10 MG tablet, Take 1 tablet by mouth daily, Disp: 15 tablet, Rfl: 0    esomeprazole Magnesium (NEXIUM) 40 MG PACK, Take 40 mg by mouth daily, Disp: , Rfl:     acetaminophen (TYLENOL) 325 MG tablet, Take 650 mg by mouth every 6 hours as needed for Pain , Disp: , Rfl:     Etanercept (ENBREL MINI) 50 MG/ML SOCT, Inject 50 mg into the skin (Patient not taking: Reported on 3/18/2022), Disp: , Rfl:     Past Medical History  Von Go  has a past medical history of Arthritis, GERD (gastroesophageal reflux disease), Hypertension, Lupus ([address] Raynaud disease, and Sjogren's syndrome ([facility] [address] Social History  Brielle  reports that she quit smoking about 4 years ago. Her smoking use included cigarettes. She smoked an average of .5 packs per day. She has never used smokeless tobacco. She reports that she does not drink alcohol and does not use drugs. Family History  Von Go family history includes Arthritis in her mother; Cancer in her father. Past Surgical History   Past Surgical History:   Procedure Laterality Date    ABDOMEN SURGERY      ADENOIDECTOMY      APPENDECTOMY      BACK SURGERY      CARDIAC SURGERY      CHOLECYSTECTOMY      FOOT SURGERY      HERNIA REPAIR      HYSTERECTOMY (CERVIX STATUS UNKNOWN)      TONSILLECTOMY         Subjective:     REVIEW OF SYSTEMS  Constitutional: denies sweats, chills and fever  HENT: denies  congestion, sinus pressure, sneezing and sore throat. Eyes: denies  pain, discharge, redness and itching. Respiratory: denies apnea, cough  Gastrointestinal: denies blood in stool, constipation, diarrhea   Endocrine: denies cold intolerance, heat intolerance, polydipsia.   Genitourinary: denies dysuria, enuresis, flank pain and hematuria. Musculoskeletal: denies arthralgias, joint swelling and neck pain. Neurological: denies numbness and headaches. Psychiatric/Behavioral: denies agitation, confusion, decreased concentration and dysphoric mood    All others reviewed and are negative. Objective:     /78   Pulse 97   Ht 5' 4\" (1.626 m)   Wt 185 lb 6 oz (84.1 kg)   BMI 31.82 kg/m²     Wt Readings from Last 3 Encounters:   03/16/23 185 lb 6 oz (84.1 kg)   03/18/22 196 lb (88.9 kg)   12/20/21 203 lb (92.1 kg)     BP Readings from Last 3 Encounters:   03/16/23 124/78   03/18/22 130/77   12/20/21 (!) 170/108       PHYSICAL EXAM  Constitutional: Oriented to person, place, and time. Appears well-developed and well-nourished. HENT:   Head: Normocephalic and atraumatic. Eyes: EOM are normal. Pupils are equal, round, and reactive to light. Neck: Normal range of motion. Neck supple. No JVD present. Cardiovascular: Normal rate , normal heart sounds and intact distal pulses. Pulmonary/Chest: Effort normal and breath sounds normal. No respiratory distress. No wheezes. No rales. Abdominal: Soft. Bowel sounds are normal. No distension. There is no tenderness. Musculoskeletal: Normal range of motion. No edema. Neurological: Alert and oriented to person, place, and time. No cranial nerve deficit. Coordination normal.   Skin: Skin is warm and dry. Psychiatric: Normal mood and affect. No results found for: CKTOTAL, CKMB, CKMBINDEX    Lab Results   Component Value Date/Time    HCT 43.8 08/09/2011 11:28 AM       No results found for: NA, K, CL, CO2, BUN, LABALBU, CREATININE, CALCIUM, GFRAA, LABGLOM, GLUCOSE, GLU    No results found for: ALKPHOS, ALT, AST, PROT, BILITOT, BILIDIR, IBILI, LABALBU    No results found for: MG    No results found for: INR, PROTIME      No results found for: LABA1C    No results found for: TRIG, HDL, LDLCALC, LDLDIRECT, LABVLDL    No results found for: TSH      Testing Reviewed:       I haveindividually reviewed the below cardiac tests    EKG:    ECHO: Results for orders placed during the hospital encounter of 12/13/21    ECHO Complete 2D W Doppler W Color    Narrative  Transthoracic Echocardiography Report (TTE)    Demographics    Patient Name    Mabel Joseph  Gender               Female  JOYCE    MR #            [id number]      Race                     Ethnicity    Account #       [de-identified]      Room Number    Accession       [id number]     Date of Study        12/13/2021  Number    Date of Birth   1965     Referring Physician  Johanny Kdid MD    Age             64 year(s)     Sonographer          Benita Malone RDCS,  T    Interpreting         Echo reader of the  Physician            week  Radha Ford MD    Procedure    Type of Study    TTE procedure:ECHOCARDIOGRAM COMPLETE 2D W DOPPLER W COLOR. Procedure Date  Date: 12/13/2021 Start: 07:53 AM    Study Location: Echo Lab  Technical Quality: Adequate visualization    Indications:Shortness of breath. Additional Medical History:GERD, Arthritis, Hypertension, Lupus, Raynauds  disease, Sjogrens syndrome, Former smoker, Rheumatoid arthritis. Patient Status: Routine    Height: 64 inches Weight: 207 pounds BSA: 1.98 m^2 BMI: 35.53 kg/m^2    BP: 160/90 mmHg    Conclusions    Summary  Ejection fraction is visually estimated at 55%. Overall left ventricular function is normal.  The aortic valve leaflets were not well visualized. Aortic valve appears tricuspid. Aortic valve leaflets are somewhat thickened. Aortic valve leaflets are Mildly calcified. Mild-to-moderate aortic regurgitation is noted.     Signature    ----------------------------------------------------------------  Electronically signed by Radha Ford MD (Interpreting  physician) on 12/15/2021 at 07:42 PM  ----------------------------------------------------------------    Findings    Mitral Valve  The mitral valve structure was normal with normal leaflet separation. DOPPLER: The transmitral velocity was within the normal range with no  evidence for mitral stenosis. There was no evidence of mitral  regurgitation. Aortic Valve  The aortic valve leaflets were not well visualized. Aortic valve appears tricuspid. Aortic valve leaflets are somewhat thickened. Aortic valve leaflets are Mildly calcified. Mild-to-moderate aortic regurgitation is noted. Tricuspid Valve  Trivial tricuspid regurgitation visualized. Pulmonic Valve  The pulmonic valve was not well visualized . Trivial pulmonic regurgitation visualized. Left Atrium  Left atrial size was normal.    Left Ventricle  Ejection fraction is visually estimated at 55%. Overall left ventricular function is normal.    Right Atrium  Right atrial size was normal.    Right Ventricle  The right ventricular size was normal with normal systolic function and  wall thickness. Pericardial Effusion  The pericardium was normal in appearance with no evidence of a pericardial  effusion. Pleural Effusion  No evidence of pleural effusion. Aorta / Great Vessels  -Aortic root dimension within normal limits.  -The Pulmonary artery is within normal limits. -IVC size is within normal limits with normal respiratory phasic changes.     M-Mode/2D Measurements & Calculations    LV Diastolic    LV Systolic Dimension: 3  AV Cusp Separation: 2 cmLA  Dimension: 4.2  cm                        Dimension: 3.2 cmAO Root  cm              LV Volume Diastolic: 81.0 Dimension: 2.9 cmLA Area: 12.2  LV FS:28.6 %    ml                        cm^2  LV PW           LV Volume Systolic: 35 ml  Diastolic: 1 cm LV EDV/LV EDV Index: 78.6  Septum          ml/40 m^2LV ESV/LV ESV  Diastolic: 1.3  Index: 35 DN/25 m^2       RV Diastolic Dimension: 2.7 cm  cm              EF Calculated: 55.5 %  LA/Aorta: 1.1    LA volume/Index: 25.7 ml /13m^2    Doppler Measurements & Calculations    MV Peak E-Wave: 60.2 cm/s  AV Peak Velocity: 147 LVOT Peak Velocity: 116  MV Peak A-Wave: 93 cm/s    cm/s                  cm/s  MV E/A Ratio: 0.65         AV Peak Gradient:     LVOT Peak Gradient: 5  MV Peak Gradient: 1.45     8.64 mmHg             mmHg  mmHg  TV Peak E-Wave: 48.7 cm/s  MV Deceleration Time: 203                        TV Peak A-Wave: 52.2 cm/s  msec  AV P1/2t: 539 msec    TV Peak Gradient: 0.95  IVRT: 79 msec         mmHg  MV E' Septal Velocity: 6.7                       TR Velocity:271 cm/s  cm/s                                             TR Gradient:29.38 mmHg  MV A' Septal Velocity:     AV DVI (Vmax):0.79    PV Peak Velocity: 120  11.6 cm/s                                        cm/s  MV E' Lateral Velocity:                          PV Peak Gradient: 5.76  7.9 cm/s                                         mmHg  MV A' Lateral Velocity:  11.9 cm/s  E/E' septal: 8.99  E/E' lateral: 7.62  MR Velocity: 523 cm/s    [id number]%[id number]%3d      STRESS:    CATH:    Assessment/Plan       Diagnosis Orders   1. SOB (shortness of breath) on exertion  EKG 12 lead      2. Chest pain in adult  EKG 12 lead          Hypertension--improved  Lupus  Sjogren's syndrome  Raynaud's disease  GERD     Has a lot of pain due to joints  BP has been well controlled  Labs from [place] were reviewed  Not on embrel due to cost  States she lost 50 lbs in last 3-4 months without trying  On amlodipine 10mg daily  BP better today  Reviewed Echo and follow up  The patient is asked to make an attempt to improve diet and exercise patterns to aid in medical management of this problem. Advised patient to call office or seek immediate medical attention if there is any new onset of  any chest pain, sob, palpitations, lightheadedness, dizziness, orthopnea, PND or pedal edema. All medication side effects were discussed in details.     Return if symptoms worsen or fail to improve, for Review testing, Regular follow up.        Electronically signed by Karthik Motta MD [facility]  3/16/2023 at 1:14 PM EST

## 2023-03-16 NOTE — PROGRESS NOTES
Pt here for 1 yr check up     Pt continues with chest pain, all over joint pain , notice last few days,dizziness, bilateral leg cramps    Pts mother passed away recently,

## 2023-05-02 ENCOUNTER — APPOINTMENT (OUTPATIENT)
Dept: GENERAL RADIOLOGY | Age: 58
DRG: 660 | End: 2023-05-02
Payer: MEDICARE

## 2023-05-02 ENCOUNTER — HOSPITAL ENCOUNTER (INPATIENT)
Age: 58
LOS: 9 days | Discharge: HOME | DRG: 660 | End: 2023-05-11
Attending: STUDENT IN AN ORGANIZED HEALTH CARE EDUCATION/TRAINING PROGRAM | Admitting: SURGERY
Payer: MEDICARE

## 2023-05-02 ENCOUNTER — APPOINTMENT (OUTPATIENT)
Dept: CT IMAGING | Age: 58
DRG: 660 | End: 2023-05-02
Payer: MEDICARE

## 2023-05-02 DIAGNOSIS — K56.609 SMALL BOWEL OBSTRUCTION (HCC): Primary | ICD-10-CM

## 2023-05-02 DIAGNOSIS — Z90.49 S/P SMALL BOWEL RESECTION: ICD-10-CM

## 2023-05-02 DIAGNOSIS — G89.18 POST-OP PAIN: ICD-10-CM

## 2023-05-02 LAB
ALBUMIN SERPL BCG-MCNC: 4.4 G/DL (ref 3.5–5.1)
ALP SERPL-CCNC: 82 U/L (ref 38–126)
ALT SERPL W/O P-5'-P-CCNC: 27 U/L (ref 11–66)
ANION GAP SERPL CALC-SCNC: 15 MEQ/L (ref 8–16)
AST SERPL-CCNC: 22 U/L (ref 5–40)
BASOPHILS ABSOLUTE: 0.1 THOU/MM3 (ref 0–0.1)
BASOPHILS NFR BLD AUTO: 0.6 %
BILIRUB SERPL-MCNC: 0.9 MG/DL (ref 0.3–1.2)
BILIRUB UR QL STRIP.AUTO: NEGATIVE
BUN SERPL-MCNC: 12 MG/DL (ref 7–22)
CALCIUM SERPL-MCNC: 10.2 MG/DL (ref 8.5–10.5)
CHARACTER UR: CLEAR
CHLORIDE SERPL-SCNC: 98 MEQ/L (ref 98–111)
CO2 SERPL-SCNC: 24 MEQ/L (ref 23–33)
COLOR: YELLOW
CREAT SERPL-MCNC: 0.6 MG/DL (ref 0.4–1.2)
DEPRECATED RDW RBC AUTO: 45.4 FL (ref 35–45)
EKG ATRIAL RATE: 90 BPM
EKG P AXIS: 47 DEGREES
EKG P-R INTERVAL: 146 MS
EKG Q-T INTERVAL: 370 MS
EKG QRS DURATION: 96 MS
EKG QTC CALCULATION (BAZETT): 452 MS
EKG R AXIS: 40 DEGREES
EKG T AXIS: 71 DEGREES
EKG VENTRICULAR RATE: 90 BPM
EOSINOPHIL NFR BLD AUTO: 1.6 %
EOSINOPHILS ABSOLUTE: 0.2 THOU/MM3 (ref 0–0.4)
ERYTHROCYTE [DISTWIDTH] IN BLOOD BY AUTOMATED COUNT: 14.2 % (ref 11.5–14.5)
GFR SERPL CREATININE-BSD FRML MDRD: > 60 ML/MIN/1.73M2
GLUCOSE SERPL-MCNC: 96 MG/DL (ref 70–108)
GLUCOSE UR QL STRIP.AUTO: NEGATIVE MG/DL
HCT VFR BLD AUTO: 52.9 % (ref 37–47)
HGB BLD-MCNC: 17.7 GM/DL (ref 12–16)
HGB UR QL STRIP.AUTO: NEGATIVE
IMM GRANULOCYTES # BLD AUTO: 0.06 THOU/MM3 (ref 0–0.07)
IMM GRANULOCYTES NFR BLD AUTO: 0.5 %
KETONES UR QL STRIP.AUTO: NEGATIVE
LIPASE SERPL-CCNC: 19.2 U/L (ref 5.6–51.3)
LYMPHOCYTES ABSOLUTE: 4.3 THOU/MM3 (ref 1–4.8)
LYMPHOCYTES NFR BLD AUTO: 33.6 %
MCH RBC QN AUTO: 29.9 PG (ref 26–33)
MCHC RBC AUTO-ENTMCNC: 33.5 GM/DL (ref 32.2–35.5)
MCV RBC AUTO: 89.5 FL (ref 81–99)
MONOCYTES ABSOLUTE: 1 THOU/MM3 (ref 0.4–1.3)
MONOCYTES NFR BLD AUTO: 7.5 %
NEUTROPHILS NFR BLD AUTO: 56.2 %
NITRITE UR QL STRIP: NEGATIVE
NRBC BLD AUTO-RTO: 0 /100 WBC
OSMOLALITY SERPL CALC.SUM OF ELEC: 273.4 MOSMOL/KG (ref 275–300)
PH UR STRIP.AUTO: 7 [PH] (ref 5–9)
PLATELET # BLD AUTO: 284 THOU/MM3 (ref 130–400)
PMV BLD AUTO: 11.3 FL (ref 9.4–12.4)
POTASSIUM SERPL-SCNC: 4.5 MEQ/L (ref 3.5–5.2)
PROT SERPL-MCNC: 6.9 G/DL (ref 6.1–8)
PROT UR STRIP.AUTO-MCNC: NEGATIVE MG/DL
RBC # BLD AUTO: 5.91 MILL/MM3 (ref 4.2–5.4)
SEGMENTED NEUTROPHILS ABSOLUTE COUNT: 7.1 THOU/MM3 (ref 1.8–7.7)
SODIUM SERPL-SCNC: 137 MEQ/L (ref 135–145)
SP GR UR REFRACT.AUTO: > 1.03 (ref 1–1.03)
TROPONIN T: < 0.01 NG/ML
UROBILINOGEN, URINE: 0.2 EU/DL (ref 0–1)
WBC # BLD AUTO: 12.7 THOU/MM3 (ref 4.8–10.8)
WBC #/AREA URNS HPF: NEGATIVE /[HPF]

## 2023-05-02 PROCEDURE — 84484 ASSAY OF TROPONIN QUANT: CPT

## 2023-05-02 PROCEDURE — 93005 ELECTROCARDIOGRAM TRACING: CPT

## 2023-05-02 PROCEDURE — 74177 CT ABD & PELVIS W/CONTRAST: CPT

## 2023-05-02 PROCEDURE — 96375 TX/PRO/DX INJ NEW DRUG ADDON: CPT

## 2023-05-02 PROCEDURE — 6360000002 HC RX W HCPCS

## 2023-05-02 PROCEDURE — 71045 X-RAY EXAM CHEST 1 VIEW: CPT

## 2023-05-02 PROCEDURE — 2580000003 HC RX 258: Performed by: NURSE PRACTITIONER

## 2023-05-02 PROCEDURE — 80053 COMPREHEN METABOLIC PANEL: CPT

## 2023-05-02 PROCEDURE — 99285 EMERGENCY DEPT VISIT HI MDM: CPT

## 2023-05-02 PROCEDURE — 6360000002 HC RX W HCPCS: Performed by: NURSE PRACTITIONER

## 2023-05-02 PROCEDURE — 1200000000 HC SEMI PRIVATE

## 2023-05-02 PROCEDURE — 36415 COLL VENOUS BLD VENIPUNCTURE: CPT

## 2023-05-02 PROCEDURE — 81003 URINALYSIS AUTO W/O SCOPE: CPT

## 2023-05-02 PROCEDURE — 93010 ELECTROCARDIOGRAM REPORT: CPT | Performed by: NUCLEAR MEDICINE

## 2023-05-02 PROCEDURE — APPSS45 APP SPLIT SHARED TIME 31-45 MINUTES: Performed by: NURSE PRACTITIONER

## 2023-05-02 PROCEDURE — 83690 ASSAY OF LIPASE: CPT

## 2023-05-02 PROCEDURE — 96374 THER/PROPH/DIAG INJ IV PUSH: CPT

## 2023-05-02 PROCEDURE — C9113 INJ PANTOPRAZOLE SODIUM, VIA: HCPCS | Performed by: NURSE PRACTITIONER

## 2023-05-02 PROCEDURE — A4216 STERILE WATER/SALINE, 10 ML: HCPCS | Performed by: NURSE PRACTITIONER

## 2023-05-02 PROCEDURE — 85025 COMPLETE CBC W/AUTO DIFF WBC: CPT

## 2023-05-02 PROCEDURE — 6360000004 HC RX CONTRAST MEDICATION

## 2023-05-02 PROCEDURE — 99221 1ST HOSP IP/OBS SF/LOW 40: CPT | Performed by: NURSE PRACTITIONER

## 2023-05-02 RX ORDER — MORPHINE SULFATE 4 MG/ML
4 INJECTION, SOLUTION INTRAMUSCULAR; INTRAVENOUS ONCE
Status: COMPLETED | OUTPATIENT
Start: 2023-05-02 | End: 2023-05-02

## 2023-05-02 RX ORDER — ACETAMINOPHEN 325 MG/1
650 TABLET ORAL EVERY 4 HOURS PRN
Status: DISCONTINUED | OUTPATIENT
Start: 2023-05-02 | End: 2023-05-11 | Stop reason: HOSPADM

## 2023-05-02 RX ORDER — ONDANSETRON 2 MG/ML
4 INJECTION INTRAMUSCULAR; INTRAVENOUS ONCE
Status: COMPLETED | OUTPATIENT
Start: 2023-05-02 | End: 2023-05-02

## 2023-05-02 RX ORDER — SODIUM CHLORIDE 9 MG/ML
INJECTION, SOLUTION INTRAVENOUS PRN
Status: DISCONTINUED | OUTPATIENT
Start: 2023-05-02 | End: 2023-05-03 | Stop reason: SDUPTHER

## 2023-05-02 RX ORDER — SODIUM CHLORIDE 0.9 % (FLUSH) 0.9 %
5-40 SYRINGE (ML) INJECTION PRN
Status: DISCONTINUED | OUTPATIENT
Start: 2023-05-02 | End: 2023-05-03 | Stop reason: SDUPTHER

## 2023-05-02 RX ORDER — SODIUM CHLORIDE 0.9 % (FLUSH) 0.9 %
5-40 SYRINGE (ML) INJECTION EVERY 12 HOURS SCHEDULED
Status: DISCONTINUED | OUTPATIENT
Start: 2023-05-02 | End: 2023-05-03 | Stop reason: SDUPTHER

## 2023-05-02 RX ORDER — ENOXAPARIN SODIUM 100 MG/ML
40 INJECTION SUBCUTANEOUS DAILY
Status: DISCONTINUED | OUTPATIENT
Start: 2023-05-03 | End: 2023-05-03 | Stop reason: SDUPTHER

## 2023-05-02 RX ORDER — ONDANSETRON 4 MG/1
4 TABLET, ORALLY DISINTEGRATING ORAL EVERY 8 HOURS PRN
Status: DISCONTINUED | OUTPATIENT
Start: 2023-05-02 | End: 2023-05-03 | Stop reason: SDUPTHER

## 2023-05-02 RX ORDER — DEXTROSE AND SODIUM CHLORIDE 5; .45 G/100ML; G/100ML
INJECTION, SOLUTION INTRAVENOUS CONTINUOUS
Status: DISCONTINUED | OUTPATIENT
Start: 2023-05-02 | End: 2023-05-09

## 2023-05-02 RX ORDER — ONDANSETRON 2 MG/ML
4 INJECTION INTRAMUSCULAR; INTRAVENOUS EVERY 6 HOURS PRN
Status: DISCONTINUED | OUTPATIENT
Start: 2023-05-02 | End: 2023-05-03 | Stop reason: SDUPTHER

## 2023-05-02 RX ADMIN — SODIUM CHLORIDE, PRESERVATIVE FREE 40 MG: 5 INJECTION INTRAVENOUS at 17:38

## 2023-05-02 RX ADMIN — ONDANSETRON 4 MG: 2 INJECTION INTRAMUSCULAR; INTRAVENOUS at 19:43

## 2023-05-02 RX ADMIN — ONDANSETRON 4 MG: 2 INJECTION INTRAMUSCULAR; INTRAVENOUS at 13:08

## 2023-05-02 RX ADMIN — HYDROMORPHONE HYDROCHLORIDE 0.5 MG: 1 INJECTION, SOLUTION INTRAMUSCULAR; INTRAVENOUS; SUBCUTANEOUS at 17:40

## 2023-05-02 RX ADMIN — DEXTROSE AND SODIUM CHLORIDE: 5; 450 INJECTION, SOLUTION INTRAVENOUS at 17:38

## 2023-05-02 RX ADMIN — IOPAMIDOL 80 ML: 755 INJECTION, SOLUTION INTRAVENOUS at 13:20

## 2023-05-02 RX ADMIN — HYDROMORPHONE HYDROCHLORIDE 0.5 MG: 1 INJECTION, SOLUTION INTRAMUSCULAR; INTRAVENOUS; SUBCUTANEOUS at 22:01

## 2023-05-02 RX ADMIN — MORPHINE SULFATE 4 MG: 4 INJECTION, SOLUTION INTRAMUSCULAR; INTRAVENOUS at 13:08

## 2023-05-02 ASSESSMENT — PAIN DESCRIPTION - LOCATION
LOCATION: ABDOMEN

## 2023-05-02 ASSESSMENT — PAIN DESCRIPTION - ORIENTATION
ORIENTATION: MID
ORIENTATION: MID

## 2023-05-02 ASSESSMENT — PAIN DESCRIPTION - DESCRIPTORS
DESCRIPTORS: PRESSURE
DESCRIPTORS: PRESSURE;CRAMPING
DESCRIPTORS: PRESSURE;CRAMPING
DESCRIPTORS: ACHING;PRESSURE
DESCRIPTORS: DISCOMFORT;PRESSURE

## 2023-05-02 ASSESSMENT — PAIN - FUNCTIONAL ASSESSMENT
PAIN_FUNCTIONAL_ASSESSMENT: ACTIVITIES ARE NOT PREVENTED

## 2023-05-02 ASSESSMENT — PAIN SCALES - GENERAL
PAINLEVEL_OUTOF10: 6
PAINLEVEL_OUTOF10: 3
PAINLEVEL_OUTOF10: 3
PAINLEVEL_OUTOF10: 7
PAINLEVEL_OUTOF10: 7
PAINLEVEL_OUTOF10: 6
PAINLEVEL_OUTOF10: 3

## 2023-05-03 ENCOUNTER — ANESTHESIA (OUTPATIENT)
Dept: OPERATING ROOM | Age: 58
End: 2023-05-03
Payer: MEDICARE

## 2023-05-03 ENCOUNTER — APPOINTMENT (OUTPATIENT)
Dept: GENERAL RADIOLOGY | Age: 58
DRG: 660 | End: 2023-05-03
Payer: MEDICARE

## 2023-05-03 ENCOUNTER — ANESTHESIA EVENT (OUTPATIENT)
Dept: OPERATING ROOM | Age: 58
End: 2023-05-03
Payer: MEDICARE

## 2023-05-03 LAB
BASOPHILS ABSOLUTE: 0 THOU/MM3 (ref 0–0.1)
BASOPHILS NFR BLD AUTO: 0.5 %
DEPRECATED RDW RBC AUTO: 47.8 FL (ref 35–45)
EOSINOPHIL NFR BLD AUTO: 2.6 %
EOSINOPHILS ABSOLUTE: 0.2 THOU/MM3 (ref 0–0.4)
ERYTHROCYTE [DISTWIDTH] IN BLOOD BY AUTOMATED COUNT: 14.2 % (ref 11.5–14.5)
HCT VFR BLD AUTO: 46.8 % (ref 37–47)
HGB BLD-MCNC: 15.1 GM/DL (ref 12–16)
IMM GRANULOCYTES # BLD AUTO: 0.02 THOU/MM3 (ref 0–0.07)
IMM GRANULOCYTES NFR BLD AUTO: 0.3 %
LYMPHOCYTES ABSOLUTE: 2.8 THOU/MM3 (ref 1–4.8)
LYMPHOCYTES NFR BLD AUTO: 42.7 %
MCH RBC QN AUTO: 29.7 PG (ref 26–33)
MCHC RBC AUTO-ENTMCNC: 32.3 GM/DL (ref 32.2–35.5)
MCV RBC AUTO: 92.1 FL (ref 81–99)
MONOCYTES ABSOLUTE: 0.5 THOU/MM3 (ref 0.4–1.3)
MONOCYTES NFR BLD AUTO: 8.3 %
NEUTROPHILS NFR BLD AUTO: 45.6 %
NRBC BLD AUTO-RTO: 0 /100 WBC
PLATELET # BLD AUTO: 228 THOU/MM3 (ref 130–400)
PMV BLD AUTO: 11.1 FL (ref 9.4–12.4)
RBC # BLD AUTO: 5.08 MILL/MM3 (ref 4.2–5.4)
SEGMENTED NEUTROPHILS ABSOLUTE COUNT: 3 THOU/MM3 (ref 1.8–7.7)
WBC # BLD AUTO: 6.5 THOU/MM3 (ref 4.8–10.8)

## 2023-05-03 PROCEDURE — 6360000002 HC RX W HCPCS: Performed by: SURGERY

## 2023-05-03 PROCEDURE — 2580000003 HC RX 258: Performed by: NURSE PRACTITIONER

## 2023-05-03 PROCEDURE — 44120 REMOVAL OF SMALL INTESTINE: CPT | Performed by: SURGERY

## 2023-05-03 PROCEDURE — 0WQF0ZZ REPAIR ABDOMINAL WALL, OPEN APPROACH: ICD-10-PCS | Performed by: SURGERY

## 2023-05-03 PROCEDURE — 3600000014 HC SURGERY LEVEL 4 ADDTL 15MIN: Performed by: SURGERY

## 2023-05-03 PROCEDURE — 1200000000 HC SEMI PRIVATE

## 2023-05-03 PROCEDURE — 7100000001 HC PACU RECOVERY - ADDTL 15 MIN: Performed by: SURGERY

## 2023-05-03 PROCEDURE — 3600000004 HC SURGERY LEVEL 4 BASE: Performed by: SURGERY

## 2023-05-03 PROCEDURE — 88307 TISSUE EXAM BY PATHOLOGIST: CPT

## 2023-05-03 PROCEDURE — C9113 INJ PANTOPRAZOLE SODIUM, VIA: HCPCS | Performed by: NURSE PRACTITIONER

## 2023-05-03 PROCEDURE — 74019 RADEX ABDOMEN 2 VIEWS: CPT

## 2023-05-03 PROCEDURE — 0DT80ZZ RESECTION OF SMALL INTESTINE, OPEN APPROACH: ICD-10-PCS | Performed by: SURGERY

## 2023-05-03 PROCEDURE — 6360000002 HC RX W HCPCS: Performed by: STUDENT IN AN ORGANIZED HEALTH CARE EDUCATION/TRAINING PROGRAM

## 2023-05-03 PROCEDURE — 2500000003 HC RX 250 WO HCPCS: Performed by: STUDENT IN AN ORGANIZED HEALTH CARE EDUCATION/TRAINING PROGRAM

## 2023-05-03 PROCEDURE — C1765 ADHESION BARRIER: HCPCS | Performed by: SURGERY

## 2023-05-03 PROCEDURE — 2580000003 HC RX 258: Performed by: STUDENT IN AN ORGANIZED HEALTH CARE EDUCATION/TRAINING PROGRAM

## 2023-05-03 PROCEDURE — 6360000002 HC RX W HCPCS

## 2023-05-03 PROCEDURE — 3700000000 HC ANESTHESIA ATTENDED CARE: Performed by: SURGERY

## 2023-05-03 PROCEDURE — 6360000002 HC RX W HCPCS: Performed by: REGISTERED NURSE

## 2023-05-03 PROCEDURE — 2709999900 HC NON-CHARGEABLE SUPPLY: Performed by: SURGERY

## 2023-05-03 PROCEDURE — 36415 COLL VENOUS BLD VENIPUNCTURE: CPT

## 2023-05-03 PROCEDURE — 6360000002 HC RX W HCPCS: Performed by: NURSE PRACTITIONER

## 2023-05-03 PROCEDURE — 3700000001 HC ADD 15 MINUTES (ANESTHESIA): Performed by: SURGERY

## 2023-05-03 PROCEDURE — 2500000003 HC RX 250 WO HCPCS: Performed by: REGISTERED NURSE

## 2023-05-03 PROCEDURE — 88300 SURGICAL PATH GROSS: CPT

## 2023-05-03 PROCEDURE — 85025 COMPLETE CBC W/AUTO DIFF WBC: CPT

## 2023-05-03 PROCEDURE — APPSS30 APP SPLIT SHARED TIME 16-30 MINUTES: Performed by: NURSE PRACTITIONER

## 2023-05-03 PROCEDURE — 7100000000 HC PACU RECOVERY - FIRST 15 MIN: Performed by: SURGERY

## 2023-05-03 PROCEDURE — 99233 SBSQ HOSP IP/OBS HIGH 50: CPT | Performed by: SURGERY

## 2023-05-03 PROCEDURE — 6370000000 HC RX 637 (ALT 250 FOR IP): Performed by: NURSE PRACTITIONER

## 2023-05-03 PROCEDURE — 0WPF0JZ REMOVAL OF SYNTHETIC SUBSTITUTE FROM ABDOMINAL WALL, OPEN APPROACH: ICD-10-PCS | Performed by: SURGERY

## 2023-05-03 PROCEDURE — 2720000010 HC SURG SUPPLY STERILE: Performed by: SURGERY

## 2023-05-03 PROCEDURE — 6370000000 HC RX 637 (ALT 250 FOR IP): Performed by: SURGERY

## 2023-05-03 RX ORDER — SUCCINYLCHOLINE/SOD CL,ISO/PF 200MG/10ML
SYRINGE (ML) INTRAVENOUS PRN
Status: DISCONTINUED | OUTPATIENT
Start: 2023-05-03 | End: 2023-05-03 | Stop reason: SDUPTHER

## 2023-05-03 RX ORDER — EPHEDRINE SULFATE/0.9% NACL/PF 50 MG/5 ML
SYRINGE (ML) INTRAVENOUS PRN
Status: DISCONTINUED | OUTPATIENT
Start: 2023-05-03 | End: 2023-05-03 | Stop reason: SDUPTHER

## 2023-05-03 RX ORDER — DROPERIDOL 2.5 MG/ML
0.62 INJECTION, SOLUTION INTRAMUSCULAR; INTRAVENOUS ONCE
Status: COMPLETED | OUTPATIENT
Start: 2023-05-03 | End: 2023-05-03

## 2023-05-03 RX ORDER — ONDANSETRON 2 MG/ML
4 INJECTION INTRAMUSCULAR; INTRAVENOUS
Status: DISCONTINUED | OUTPATIENT
Start: 2023-05-03 | End: 2023-05-03 | Stop reason: HOSPADM

## 2023-05-03 RX ORDER — DIPHENHYDRAMINE HYDROCHLORIDE 50 MG/ML
12.5 INJECTION INTRAMUSCULAR; INTRAVENOUS
Status: DISCONTINUED | OUTPATIENT
Start: 2023-05-03 | End: 2023-05-03 | Stop reason: HOSPADM

## 2023-05-03 RX ORDER — SODIUM CHLORIDE 0.9 % (FLUSH) 0.9 %
5-40 SYRINGE (ML) INJECTION EVERY 12 HOURS SCHEDULED
Status: DISCONTINUED | OUTPATIENT
Start: 2023-05-03 | End: 2023-05-03 | Stop reason: HOSPADM

## 2023-05-03 RX ORDER — SODIUM CHLORIDE 0.9 % (FLUSH) 0.9 %
5-40 SYRINGE (ML) INJECTION PRN
Status: DISCONTINUED | OUTPATIENT
Start: 2023-05-03 | End: 2023-05-11 | Stop reason: HOSPADM

## 2023-05-03 RX ORDER — MIDAZOLAM HYDROCHLORIDE 1 MG/ML
INJECTION INTRAMUSCULAR; INTRAVENOUS PRN
Status: DISCONTINUED | OUTPATIENT
Start: 2023-05-03 | End: 2023-05-03 | Stop reason: SDUPTHER

## 2023-05-03 RX ORDER — SODIUM CHLORIDE 0.9 % (FLUSH) 0.9 %
5-40 SYRINGE (ML) INJECTION EVERY 12 HOURS SCHEDULED
Status: DISCONTINUED | OUTPATIENT
Start: 2023-05-03 | End: 2023-05-11 | Stop reason: HOSPADM

## 2023-05-03 RX ORDER — FENTANYL CITRATE 50 UG/ML
50 INJECTION, SOLUTION INTRAMUSCULAR; INTRAVENOUS EVERY 5 MIN PRN
Status: DISCONTINUED | OUTPATIENT
Start: 2023-05-03 | End: 2023-05-03 | Stop reason: HOSPADM

## 2023-05-03 RX ORDER — ONDANSETRON 4 MG/1
4 TABLET, ORALLY DISINTEGRATING ORAL EVERY 8 HOURS PRN
Status: DISCONTINUED | OUTPATIENT
Start: 2023-05-03 | End: 2023-05-11 | Stop reason: HOSPADM

## 2023-05-03 RX ORDER — SODIUM CHLORIDE 9 MG/ML
INJECTION, SOLUTION INTRAVENOUS CONTINUOUS PRN
Status: DISCONTINUED | OUTPATIENT
Start: 2023-05-03 | End: 2023-05-03 | Stop reason: SDUPTHER

## 2023-05-03 RX ORDER — DEXAMETHASONE SODIUM PHOSPHATE 10 MG/ML
INJECTION, EMULSION INTRAMUSCULAR; INTRAVENOUS PRN
Status: DISCONTINUED | OUTPATIENT
Start: 2023-05-03 | End: 2023-05-03 | Stop reason: SDUPTHER

## 2023-05-03 RX ORDER — ONDANSETRON 2 MG/ML
INJECTION INTRAMUSCULAR; INTRAVENOUS PRN
Status: DISCONTINUED | OUTPATIENT
Start: 2023-05-03 | End: 2023-05-03 | Stop reason: SDUPTHER

## 2023-05-03 RX ORDER — SODIUM CHLORIDE 0.9 % (FLUSH) 0.9 %
5-40 SYRINGE (ML) INJECTION PRN
Status: DISCONTINUED | OUTPATIENT
Start: 2023-05-03 | End: 2023-05-03 | Stop reason: HOSPADM

## 2023-05-03 RX ORDER — ROCURONIUM BROMIDE 10 MG/ML
INJECTION, SOLUTION INTRAVENOUS PRN
Status: DISCONTINUED | OUTPATIENT
Start: 2023-05-03 | End: 2023-05-03 | Stop reason: SDUPTHER

## 2023-05-03 RX ORDER — PROPOFOL 10 MG/ML
INJECTION, EMULSION INTRAVENOUS PRN
Status: DISCONTINUED | OUTPATIENT
Start: 2023-05-03 | End: 2023-05-03 | Stop reason: SDUPTHER

## 2023-05-03 RX ORDER — SODIUM CHLORIDE 9 MG/ML
INJECTION, SOLUTION INTRAVENOUS PRN
Status: DISCONTINUED | OUTPATIENT
Start: 2023-05-03 | End: 2023-05-03 | Stop reason: HOSPADM

## 2023-05-03 RX ORDER — OXYCODONE HYDROCHLORIDE 5 MG/1
10 TABLET ORAL EVERY 4 HOURS PRN
Status: DISCONTINUED | OUTPATIENT
Start: 2023-05-03 | End: 2023-05-11 | Stop reason: HOSPADM

## 2023-05-03 RX ORDER — DROPERIDOL 2.5 MG/ML
INJECTION, SOLUTION INTRAMUSCULAR; INTRAVENOUS
Status: COMPLETED
Start: 2023-05-03 | End: 2023-05-03

## 2023-05-03 RX ORDER — SODIUM CHLORIDE 9 MG/ML
INJECTION, SOLUTION INTRAVENOUS PRN
Status: DISCONTINUED | OUTPATIENT
Start: 2023-05-03 | End: 2023-05-11 | Stop reason: HOSPADM

## 2023-05-03 RX ORDER — FENTANYL CITRATE 50 UG/ML
INJECTION, SOLUTION INTRAMUSCULAR; INTRAVENOUS PRN
Status: DISCONTINUED | OUTPATIENT
Start: 2023-05-03 | End: 2023-05-03 | Stop reason: SDUPTHER

## 2023-05-03 RX ORDER — ONDANSETRON 2 MG/ML
4 INJECTION INTRAMUSCULAR; INTRAVENOUS EVERY 6 HOURS PRN
Status: DISCONTINUED | OUTPATIENT
Start: 2023-05-03 | End: 2023-05-11 | Stop reason: HOSPADM

## 2023-05-03 RX ORDER — ENOXAPARIN SODIUM 100 MG/ML
40 INJECTION SUBCUTANEOUS EVERY 24 HOURS
Status: DISCONTINUED | OUTPATIENT
Start: 2023-05-04 | End: 2023-05-11 | Stop reason: HOSPADM

## 2023-05-03 RX ORDER — OXYCODONE HYDROCHLORIDE 5 MG/1
5 TABLET ORAL EVERY 4 HOURS PRN
Status: DISCONTINUED | OUTPATIENT
Start: 2023-05-03 | End: 2023-05-11 | Stop reason: HOSPADM

## 2023-05-03 RX ADMIN — HYDROMORPHONE HYDROCHLORIDE 0.5 MG: 1 INJECTION, SOLUTION INTRAMUSCULAR; INTRAVENOUS; SUBCUTANEOUS at 01:32

## 2023-05-03 RX ADMIN — Medication 15 MG: at 12:36

## 2023-05-03 RX ADMIN — Medication 10 MG: at 12:03

## 2023-05-03 RX ADMIN — ROCURONIUM BROMIDE 40 MG: 10 INJECTION INTRAVENOUS at 11:05

## 2023-05-03 RX ADMIN — PHENYLEPHRINE HYDROCHLORIDE 150 MCG: 10 INJECTION INTRAVENOUS at 12:38

## 2023-05-03 RX ADMIN — ROCURONIUM BROMIDE 20 MG: 10 INJECTION INTRAVENOUS at 12:03

## 2023-05-03 RX ADMIN — ONDANSETRON 4 MG: 2 INJECTION INTRAMUSCULAR; INTRAVENOUS at 12:37

## 2023-05-03 RX ADMIN — PHENYLEPHRINE HYDROCHLORIDE 150 MCG: 10 INJECTION INTRAVENOUS at 12:51

## 2023-05-03 RX ADMIN — DROPERIDOL 0.62 MG: 2.5 INJECTION, SOLUTION INTRAMUSCULAR; INTRAVENOUS at 13:35

## 2023-05-03 RX ADMIN — PROPOFOL 160 MG: 10 INJECTION, EMULSION INTRAVENOUS at 10:52

## 2023-05-03 RX ADMIN — HYDROMORPHONE HYDROCHLORIDE 0.5 MG: 1 INJECTION, SOLUTION INTRAMUSCULAR; INTRAVENOUS; SUBCUTANEOUS at 08:27

## 2023-05-03 RX ADMIN — SODIUM CHLORIDE, PRESERVATIVE FREE 40 MG: 5 INJECTION INTRAVENOUS at 08:27

## 2023-05-03 RX ADMIN — HYDROMORPHONE HYDROCHLORIDE 0.25 MG: 1 INJECTION, SOLUTION INTRAMUSCULAR; INTRAVENOUS; SUBCUTANEOUS at 13:35

## 2023-05-03 RX ADMIN — HYDROMORPHONE HYDROCHLORIDE 0.5 MG: 1 INJECTION, SOLUTION INTRAMUSCULAR; INTRAVENOUS; SUBCUTANEOUS at 21:59

## 2023-05-03 RX ADMIN — ROCURONIUM BROMIDE 20 MG: 10 INJECTION INTRAVENOUS at 12:43

## 2023-05-03 RX ADMIN — Medication 2000 MG: at 11:06

## 2023-05-03 RX ADMIN — SODIUM CHLORIDE: 9 INJECTION, SOLUTION INTRAVENOUS at 10:49

## 2023-05-03 RX ADMIN — HYDROMORPHONE HYDROCHLORIDE 0.25 MG: 1 INJECTION, SOLUTION INTRAMUSCULAR; INTRAVENOUS; SUBCUTANEOUS at 13:40

## 2023-05-03 RX ADMIN — DEXTROSE AND SODIUM CHLORIDE: 5; 450 INJECTION, SOLUTION INTRAVENOUS at 22:04

## 2023-05-03 RX ADMIN — MIDAZOLAM 2 MG: 1 INJECTION INTRAMUSCULAR; INTRAVENOUS at 10:52

## 2023-05-03 RX ADMIN — Medication 15 MG: at 12:26

## 2023-05-03 RX ADMIN — Medication 10 MG: at 12:10

## 2023-05-03 RX ADMIN — DEXAMETHASONE SODIUM PHOSPHATE 10 MG: 10 INJECTION, EMULSION INTRAMUSCULAR; INTRAVENOUS at 11:16

## 2023-05-03 RX ADMIN — ROCURONIUM BROMIDE 10 MG: 10 INJECTION INTRAVENOUS at 10:52

## 2023-05-03 RX ADMIN — PHENYLEPHRINE HYDROCHLORIDE 100 MCG: 10 INJECTION INTRAVENOUS at 11:18

## 2023-05-03 RX ADMIN — FENTANYL CITRATE 50 MCG: 50 INJECTION INTRAMUSCULAR; INTRAVENOUS at 10:52

## 2023-05-03 RX ADMIN — FENTANYL CITRATE 50 MCG: 50 INJECTION INTRAMUSCULAR; INTRAVENOUS at 11:13

## 2023-05-03 RX ADMIN — HYDROMORPHONE HYDROCHLORIDE 0.5 MG: 1 INJECTION, SOLUTION INTRAMUSCULAR; INTRAVENOUS; SUBCUTANEOUS at 16:41

## 2023-05-03 RX ADMIN — OXYCODONE 10 MG: 5 TABLET ORAL at 19:13

## 2023-05-03 RX ADMIN — SODIUM CHLORIDE: 9 INJECTION, SOLUTION INTRAVENOUS at 11:46

## 2023-05-03 RX ADMIN — PHENYLEPHRINE HYDROCHLORIDE 100 MCG: 10 INJECTION INTRAVENOUS at 11:11

## 2023-05-03 RX ADMIN — Medication 120 MG: at 10:53

## 2023-05-03 RX ADMIN — SUGAMMADEX 200 MG: 100 INJECTION, SOLUTION INTRAVENOUS at 13:00

## 2023-05-03 ASSESSMENT — PAIN DESCRIPTION - LOCATION
LOCATION: ABDOMEN

## 2023-05-03 ASSESSMENT — PAIN SCALES - GENERAL
PAINLEVEL_OUTOF10: 6
PAINLEVEL_OUTOF10: 6
PAINLEVEL_OUTOF10: 8
PAINLEVEL_OUTOF10: 7
PAINLEVEL_OUTOF10: 8
PAINLEVEL_OUTOF10: 8
PAINLEVEL_OUTOF10: 7
PAINLEVEL_OUTOF10: 6
PAINLEVEL_OUTOF10: 7

## 2023-05-03 ASSESSMENT — PAIN - FUNCTIONAL ASSESSMENT
PAIN_FUNCTIONAL_ASSESSMENT: ACTIVITIES ARE NOT PREVENTED

## 2023-05-03 ASSESSMENT — PAIN DESCRIPTION - FREQUENCY
FREQUENCY: CONTINUOUS

## 2023-05-03 ASSESSMENT — PAIN DESCRIPTION - DESCRIPTORS
DESCRIPTORS: ACHING
DESCRIPTORS: SHARP;STABBING
DESCRIPTORS: PRESSURE;CRAMPING
DESCRIPTORS: SHARP
DESCRIPTORS: ACHING
DESCRIPTORS: ACHING
DESCRIPTORS: CRAMPING;PRESSURE
DESCRIPTORS: STABBING;SHARP;BURNING
DESCRIPTORS: CRAMPING;PRESSURE

## 2023-05-03 ASSESSMENT — PAIN DESCRIPTION - ORIENTATION
ORIENTATION: MID
ORIENTATION: LOWER
ORIENTATION: MID
ORIENTATION: MID
ORIENTATION: LOWER

## 2023-05-03 ASSESSMENT — PAIN DESCRIPTION - PAIN TYPE
TYPE: SURGICAL PAIN
TYPE: ACUTE PAIN
TYPE: SURGICAL PAIN
TYPE: SURGICAL PAIN
TYPE: ACUTE PAIN

## 2023-05-03 ASSESSMENT — PAIN DESCRIPTION - ONSET: ONSET: ON-GOING

## 2023-05-03 NOTE — ANESTHESIA PRE PROCEDURE
Department of Anesthesiology  Preprocedure Note       Name:  Cory Chua   Age:  62 y.o.  :  1965                                          MRN:  [id number]         Date:  5/3/2023      Surgeon: Nadine Elias): Lucía Carrasco MD    Procedure: Procedure(s):  EXPLORATORY LAPAROTOMY VENTRAL HERNIA    Medications prior to admission:   Prior to Admission medications    Medication Sig Start Date End Date Taking?  Authorizing Provider   Magnesium 100 MG CAPS Take 200 mg by mouth daily as needed (for leg cramps)    Historical Provider, MD   amLODIPine (NORVASC) 10 MG tablet TAKE 1 TABLET EVERY DAY 23   CARLITA Campbell CNP   calcium carbonate (TUMS EX) 750 MG chewable tablet Take 1 tablet by mouth as needed    Historical Provider, MD   diclofenac sodium (VOLTAREN) 1 % GEL Apply 2 g topically 4 times daily as needed 21   Historical Provider, MD   ergocalciferol (ERGOCALCIFEROL) 1.25 MG (38876 UT) capsule Take 1 capsule by mouth once a week Takes on   Historical Provider, MD   SUMATRIPTAN SUCCINATE PO Take 25 mg by mouth once as needed for Migraine    Historical Provider, MD   ondansetron (ZOFRAN) 4 MG tablet Take 1 tablet by mouth every 8 hours as needed for Nausea or Vomiting    Historical Provider, MD   loratadine (CLARITIN) 10 MG tablet Take 1 tablet by mouth daily 16   Ann Carter MD   esomeprazole Magnesium (NEXIUM) 40 MG PACK Take 1 packet by mouth daily    Historical Provider, MD   acetaminophen (TYLENOL) 325 MG tablet Take 2 tablets by mouth every 6 hours as needed for Pain    Historical Provider, MD       Current medications:    Current Facility-Administered Medications   Medication Dose Route Frequency Provider Last Rate Last Admin    piperacillin-tazobactam (ZOSYN) 3,375 mg in dextrose 5 % 50 mL extended IVPB (mini-bag)  3,375 mg IntraVENous On Call to [id number] [place] [address], APRN - CNP        sodium chloride flush 0.9 % injection 5-40 mL  5-40 mL

## 2023-05-04 PROCEDURE — C9113 INJ PANTOPRAZOLE SODIUM, VIA: HCPCS | Performed by: NURSE PRACTITIONER

## 2023-05-04 PROCEDURE — APPSS30 APP SPLIT SHARED TIME 16-30 MINUTES: Performed by: NURSE PRACTITIONER

## 2023-05-04 PROCEDURE — 2580000003 HC RX 258: Performed by: NURSE PRACTITIONER

## 2023-05-04 PROCEDURE — 99024 POSTOP FOLLOW-UP VISIT: CPT | Performed by: NURSE PRACTITIONER

## 2023-05-04 PROCEDURE — 6360000002 HC RX W HCPCS: Performed by: NURSE PRACTITIONER

## 2023-05-04 PROCEDURE — 6370000000 HC RX 637 (ALT 250 FOR IP): Performed by: SURGERY

## 2023-05-04 PROCEDURE — 6360000002 HC RX W HCPCS: Performed by: SURGERY

## 2023-05-04 PROCEDURE — 1200000000 HC SEMI PRIVATE

## 2023-05-04 RX ADMIN — OXYCODONE 10 MG: 5 TABLET ORAL at 15:34

## 2023-05-04 RX ADMIN — OXYCODONE 10 MG: 5 TABLET ORAL at 06:07

## 2023-05-04 RX ADMIN — OXYCODONE 10 MG: 5 TABLET ORAL at 10:39

## 2023-05-04 RX ADMIN — HYDROMORPHONE HYDROCHLORIDE 0.25 MG: 1 INJECTION, SOLUTION INTRAMUSCULAR; INTRAVENOUS; SUBCUTANEOUS at 23:30

## 2023-05-04 RX ADMIN — ENOXAPARIN SODIUM 40 MG: 100 INJECTION SUBCUTANEOUS at 06:32

## 2023-05-04 RX ADMIN — HYDROMORPHONE HYDROCHLORIDE 0.25 MG: 1 INJECTION, SOLUTION INTRAMUSCULAR; INTRAVENOUS; SUBCUTANEOUS at 20:00

## 2023-05-04 RX ADMIN — NALOXEGOL OXALATE 25 MG: 25 TABLET, FILM COATED ORAL at 08:26

## 2023-05-04 RX ADMIN — HYDROMORPHONE HYDROCHLORIDE 0.5 MG: 1 INJECTION, SOLUTION INTRAMUSCULAR; INTRAVENOUS; SUBCUTANEOUS at 02:33

## 2023-05-04 RX ADMIN — SODIUM CHLORIDE, PRESERVATIVE FREE 40 MG: 5 INJECTION INTRAVENOUS at 08:26

## 2023-05-04 RX ADMIN — HYDROMORPHONE HYDROCHLORIDE 0.5 MG: 1 INJECTION, SOLUTION INTRAMUSCULAR; INTRAVENOUS; SUBCUTANEOUS at 14:00

## 2023-05-04 RX ADMIN — OXYCODONE HYDROCHLORIDE 5 MG: 5 TABLET ORAL at 21:08

## 2023-05-04 RX ADMIN — HYDROMORPHONE HYDROCHLORIDE 0.5 MG: 1 INJECTION, SOLUTION INTRAMUSCULAR; INTRAVENOUS; SUBCUTANEOUS at 08:43

## 2023-05-04 RX ADMIN — ONDANSETRON 4 MG: 2 INJECTION INTRAMUSCULAR; INTRAVENOUS at 02:22

## 2023-05-04 RX ADMIN — DEXTROSE AND SODIUM CHLORIDE: 5; 450 INJECTION, SOLUTION INTRAVENOUS at 14:00

## 2023-05-04 ASSESSMENT — PAIN SCALES - GENERAL
PAINLEVEL_OUTOF10: 7
PAINLEVEL_OUTOF10: 7
PAINLEVEL_OUTOF10: 10
PAINLEVEL_OUTOF10: 7
PAINLEVEL_OUTOF10: 5
PAINLEVEL_OUTOF10: 7
PAINLEVEL_OUTOF10: 5
PAINLEVEL_OUTOF10: 6
PAINLEVEL_OUTOF10: 3
PAINLEVEL_OUTOF10: 5
PAINLEVEL_OUTOF10: 6
PAINLEVEL_OUTOF10: 3

## 2023-05-04 ASSESSMENT — PAIN DESCRIPTION - LOCATION
LOCATION: ABDOMEN

## 2023-05-04 ASSESSMENT — PAIN DESCRIPTION - DESCRIPTORS
DESCRIPTORS: SORE;PRESSURE
DESCRIPTORS: SORE
DESCRIPTORS: SHARP
DESCRIPTORS: SORE;SHARP
DESCRIPTORS: SORE;PRESSURE
DESCRIPTORS: SORE;SHARP
DESCRIPTORS: SORE;SHARP

## 2023-05-04 ASSESSMENT — PAIN - FUNCTIONAL ASSESSMENT
PAIN_FUNCTIONAL_ASSESSMENT: ACTIVITIES ARE NOT PREVENTED

## 2023-05-04 ASSESSMENT — PAIN DESCRIPTION - ORIENTATION
ORIENTATION: MID

## 2023-05-04 ASSESSMENT — PAIN DESCRIPTION - FREQUENCY: FREQUENCY: CONTINUOUS

## 2023-05-04 ASSESSMENT — PAIN DESCRIPTION - ONSET: ONSET: ON-GOING

## 2023-05-04 ASSESSMENT — PAIN DESCRIPTION - PAIN TYPE
TYPE: SURGICAL PAIN
TYPE: SURGICAL PAIN

## 2023-05-05 PROBLEM — Z90.49 S/P SMALL BOWEL RESECTION: Status: ACTIVE | Noted: 2023-05-05

## 2023-05-05 PROBLEM — K56.609 SMALL BOWEL OBSTRUCTION (HCC): Status: RESOLVED | Noted: 2023-05-02 | Resolved: 2023-05-05

## 2023-05-05 LAB
ANION GAP SERPL CALC-SCNC: 14 MEQ/L (ref 8–16)
BUN SERPL-MCNC: 5 MG/DL (ref 7–22)
CALCIUM SERPL-MCNC: 8.6 MG/DL (ref 8.5–10.5)
CHLORIDE SERPL-SCNC: 102 MEQ/L (ref 98–111)
CO2 SERPL-SCNC: 24 MEQ/L (ref 23–33)
CREAT SERPL-MCNC: 0.4 MG/DL (ref 0.4–1.2)
DEPRECATED RDW RBC AUTO: 46.8 FL (ref 35–45)
ERYTHROCYTE [DISTWIDTH] IN BLOOD BY AUTOMATED COUNT: 13.9 % (ref 11.5–14.5)
GFR SERPL CREATININE-BSD FRML MDRD: > 60 ML/MIN/1.73M2
GLUCOSE SERPL-MCNC: 100 MG/DL (ref 70–108)
HCT VFR BLD AUTO: 44.4 % (ref 37–47)
HGB BLD-MCNC: 14.3 GM/DL (ref 12–16)
MCH RBC QN AUTO: 29.7 PG (ref 26–33)
MCHC RBC AUTO-ENTMCNC: 32.2 GM/DL (ref 32.2–35.5)
MCV RBC AUTO: 92.1 FL (ref 81–99)
PLATELET # BLD AUTO: 230 THOU/MM3 (ref 130–400)
PMV BLD AUTO: 11.1 FL (ref 9.4–12.4)
POTASSIUM SERPL-SCNC: 3.7 MEQ/L (ref 3.5–5.2)
RBC # BLD AUTO: 4.82 MILL/MM3 (ref 4.2–5.4)
SODIUM SERPL-SCNC: 140 MEQ/L (ref 135–145)
WBC # BLD AUTO: 11.4 THOU/MM3 (ref 4.8–10.8)

## 2023-05-05 PROCEDURE — A4216 STERILE WATER/SALINE, 10 ML: HCPCS | Performed by: NURSE PRACTITIONER

## 2023-05-05 PROCEDURE — 6360000002 HC RX W HCPCS: Performed by: NURSE PRACTITIONER

## 2023-05-05 PROCEDURE — 99024 POSTOP FOLLOW-UP VISIT: CPT | Performed by: NURSE PRACTITIONER

## 2023-05-05 PROCEDURE — 6370000000 HC RX 637 (ALT 250 FOR IP): Performed by: NURSE PRACTITIONER

## 2023-05-05 PROCEDURE — 2580000003 HC RX 258: Performed by: SURGERY

## 2023-05-05 PROCEDURE — 6360000002 HC RX W HCPCS: Performed by: SURGERY

## 2023-05-05 PROCEDURE — 6370000000 HC RX 637 (ALT 250 FOR IP): Performed by: SURGERY

## 2023-05-05 PROCEDURE — 2580000003 HC RX 258: Performed by: NURSE PRACTITIONER

## 2023-05-05 PROCEDURE — 85027 COMPLETE CBC AUTOMATED: CPT

## 2023-05-05 PROCEDURE — APPSS30 APP SPLIT SHARED TIME 16-30 MINUTES: Performed by: NURSE PRACTITIONER

## 2023-05-05 PROCEDURE — 36415 COLL VENOUS BLD VENIPUNCTURE: CPT

## 2023-05-05 PROCEDURE — 1200000000 HC SEMI PRIVATE

## 2023-05-05 PROCEDURE — 80048 BASIC METABOLIC PNL TOTAL CA: CPT

## 2023-05-05 PROCEDURE — C9113 INJ PANTOPRAZOLE SODIUM, VIA: HCPCS | Performed by: NURSE PRACTITIONER

## 2023-05-05 RX ORDER — PANTOPRAZOLE SODIUM 40 MG/1
40 TABLET, DELAYED RELEASE ORAL
Status: DISCONTINUED | OUTPATIENT
Start: 2023-05-06 | End: 2023-05-11 | Stop reason: HOSPADM

## 2023-05-05 RX ORDER — OXYCODONE HYDROCHLORIDE 5 MG/1
5 TABLET ORAL EVERY 6 HOURS PRN
Qty: 20 TABLET | Refills: 0 | Status: SHIPPED | OUTPATIENT
Start: 2023-05-05 | End: 2023-05-11 | Stop reason: HOSPADM

## 2023-05-05 RX ORDER — AMLODIPINE BESYLATE 10 MG/1
10 TABLET ORAL DAILY
Status: DISCONTINUED | OUTPATIENT
Start: 2023-05-05 | End: 2023-05-11 | Stop reason: HOSPADM

## 2023-05-05 RX ORDER — DIAZEPAM 5 MG/1
5 TABLET ORAL EVERY 6 HOURS PRN
Status: DISCONTINUED | OUTPATIENT
Start: 2023-05-05 | End: 2023-05-11 | Stop reason: HOSPADM

## 2023-05-05 RX ORDER — POLYETHYLENE GLYCOL 3350 17 G/17G
17 POWDER, FOR SOLUTION ORAL DAILY
Status: DISCONTINUED | OUTPATIENT
Start: 2023-05-05 | End: 2023-05-11 | Stop reason: HOSPADM

## 2023-05-05 RX ADMIN — DIAZEPAM 5 MG: 5 TABLET ORAL at 09:23

## 2023-05-05 RX ADMIN — OXYCODONE 10 MG: 5 TABLET ORAL at 18:15

## 2023-05-05 RX ADMIN — OXYCODONE HYDROCHLORIDE 5 MG: 5 TABLET ORAL at 07:06

## 2023-05-05 RX ADMIN — HYDROMORPHONE HYDROCHLORIDE 0.5 MG: 1 INJECTION, SOLUTION INTRAMUSCULAR; INTRAVENOUS; SUBCUTANEOUS at 15:11

## 2023-05-05 RX ADMIN — ACETAMINOPHEN 650 MG: 325 TABLET ORAL at 15:16

## 2023-05-05 RX ADMIN — HYDROMORPHONE HYDROCHLORIDE 0.25 MG: 1 INJECTION, SOLUTION INTRAMUSCULAR; INTRAVENOUS; SUBCUTANEOUS at 05:41

## 2023-05-05 RX ADMIN — ENOXAPARIN SODIUM 40 MG: 100 INJECTION SUBCUTANEOUS at 06:32

## 2023-05-05 RX ADMIN — OXYCODONE 10 MG: 5 TABLET ORAL at 22:36

## 2023-05-05 RX ADMIN — SODIUM CHLORIDE, PRESERVATIVE FREE 10 ML: 5 INJECTION INTRAVENOUS at 19:27

## 2023-05-05 RX ADMIN — DIAZEPAM 5 MG: 5 TABLET ORAL at 21:39

## 2023-05-05 RX ADMIN — SODIUM CHLORIDE, PRESERVATIVE FREE 40 MG: 5 INJECTION INTRAVENOUS at 07:59

## 2023-05-05 RX ADMIN — HYDROMORPHONE HYDROCHLORIDE 0.5 MG: 1 INJECTION, SOLUTION INTRAMUSCULAR; INTRAVENOUS; SUBCUTANEOUS at 19:26

## 2023-05-05 RX ADMIN — OXYCODONE HYDROCHLORIDE 5 MG: 5 TABLET ORAL at 12:14

## 2023-05-05 RX ADMIN — DEXTROSE AND SODIUM CHLORIDE: 5; 450 INJECTION, SOLUTION INTRAVENOUS at 08:01

## 2023-05-05 RX ADMIN — NALOXEGOL OXALATE 25 MG: 25 TABLET, FILM COATED ORAL at 07:57

## 2023-05-05 RX ADMIN — DIAZEPAM 5 MG: 5 TABLET ORAL at 15:11

## 2023-05-05 RX ADMIN — POLYETHYLENE GLYCOL 3350 17 G: 17 POWDER, FOR SOLUTION ORAL at 09:23

## 2023-05-05 RX ADMIN — OXYCODONE HYDROCHLORIDE 5 MG: 5 TABLET ORAL at 02:04

## 2023-05-05 RX ADMIN — HYDROMORPHONE HYDROCHLORIDE 0.5 MG: 1 INJECTION, SOLUTION INTRAMUSCULAR; INTRAVENOUS; SUBCUTANEOUS at 10:44

## 2023-05-05 ASSESSMENT — PAIN SCALES - GENERAL
PAINLEVEL_OUTOF10: 6
PAINLEVEL_OUTOF10: 5
PAINLEVEL_OUTOF10: 5
PAINLEVEL_OUTOF10: 4
PAINLEVEL_OUTOF10: 6
PAINLEVEL_OUTOF10: 3
PAINLEVEL_OUTOF10: 4
PAINLEVEL_OUTOF10: 9
PAINLEVEL_OUTOF10: 7
PAINLEVEL_OUTOF10: 6
PAINLEVEL_OUTOF10: 7
PAINLEVEL_OUTOF10: 4
PAINLEVEL_OUTOF10: 7
PAINLEVEL_OUTOF10: 9

## 2023-05-05 ASSESSMENT — PAIN DESCRIPTION - LOCATION
LOCATION: ABDOMEN
LOCATION: ABDOMEN
LOCATION: ABDOMEN;BACK
LOCATION: ABDOMEN
LOCATION: ABDOMEN;BACK
LOCATION: ABDOMEN

## 2023-05-05 ASSESSMENT — PAIN DESCRIPTION - DESCRIPTORS
DESCRIPTORS: SORE;SHARP
DESCRIPTORS: SHARP
DESCRIPTORS: ACHING;SHARP
DESCRIPTORS: ACHING
DESCRIPTORS: ACHING;DISCOMFORT
DESCRIPTORS: SORE;SHARP

## 2023-05-05 ASSESSMENT — PAIN DESCRIPTION - ORIENTATION
ORIENTATION: MID

## 2023-05-05 ASSESSMENT — PAIN - FUNCTIONAL ASSESSMENT
PAIN_FUNCTIONAL_ASSESSMENT: ACTIVITIES ARE NOT PREVENTED
PAIN_FUNCTIONAL_ASSESSMENT: ACTIVITIES ARE NOT PREVENTED

## 2023-05-06 LAB
DEPRECATED RDW RBC AUTO: 47.2 FL (ref 35–45)
ERYTHROCYTE [DISTWIDTH] IN BLOOD BY AUTOMATED COUNT: 13.8 % (ref 11.5–14.5)
HCT VFR BLD AUTO: 42.9 % (ref 37–47)
HGB BLD-MCNC: 13.6 GM/DL (ref 12–16)
MCH RBC QN AUTO: 29.6 PG (ref 26–33)
MCHC RBC AUTO-ENTMCNC: 31.7 GM/DL (ref 32.2–35.5)
MCV RBC AUTO: 93.5 FL (ref 81–99)
PLATELET # BLD AUTO: 224 THOU/MM3 (ref 130–400)
PMV BLD AUTO: 11 FL (ref 9.4–12.4)
RBC # BLD AUTO: 4.59 MILL/MM3 (ref 4.2–5.4)
WBC # BLD AUTO: 9.8 THOU/MM3 (ref 4.8–10.8)

## 2023-05-06 PROCEDURE — 6370000000 HC RX 637 (ALT 250 FOR IP): Performed by: NURSE PRACTITIONER

## 2023-05-06 PROCEDURE — 99024 POSTOP FOLLOW-UP VISIT: CPT | Performed by: SURGERY

## 2023-05-06 PROCEDURE — 6370000000 HC RX 637 (ALT 250 FOR IP): Performed by: SURGERY

## 2023-05-06 PROCEDURE — 6360000002 HC RX W HCPCS: Performed by: SURGERY

## 2023-05-06 PROCEDURE — 1200000000 HC SEMI PRIVATE

## 2023-05-06 PROCEDURE — 2580000003 HC RX 258: Performed by: NURSE PRACTITIONER

## 2023-05-06 PROCEDURE — 36415 COLL VENOUS BLD VENIPUNCTURE: CPT

## 2023-05-06 PROCEDURE — 85027 COMPLETE CBC AUTOMATED: CPT

## 2023-05-06 RX ORDER — SIMETHICONE 80 MG
80 TABLET,CHEWABLE ORAL EVERY 6 HOURS PRN
Status: DISCONTINUED | OUTPATIENT
Start: 2023-05-06 | End: 2023-05-11 | Stop reason: HOSPADM

## 2023-05-06 RX ADMIN — DIAZEPAM 5 MG: 5 TABLET ORAL at 20:16

## 2023-05-06 RX ADMIN — HYDROMORPHONE HYDROCHLORIDE 0.5 MG: 1 INJECTION, SOLUTION INTRAMUSCULAR; INTRAVENOUS; SUBCUTANEOUS at 19:13

## 2023-05-06 RX ADMIN — SIMETHICONE 80 MG: 80 TABLET, CHEWABLE ORAL at 19:13

## 2023-05-06 RX ADMIN — DEXTROSE AND SODIUM CHLORIDE: 5; 450 INJECTION, SOLUTION INTRAVENOUS at 03:11

## 2023-05-06 RX ADMIN — OXYCODONE 10 MG: 5 TABLET ORAL at 08:40

## 2023-05-06 RX ADMIN — OXYCODONE 10 MG: 5 TABLET ORAL at 17:06

## 2023-05-06 RX ADMIN — NALOXEGOL OXALATE 25 MG: 25 TABLET, FILM COATED ORAL at 08:39

## 2023-05-06 RX ADMIN — PANTOPRAZOLE SODIUM 40 MG: 40 TABLET, DELAYED RELEASE ORAL at 06:29

## 2023-05-06 RX ADMIN — ENOXAPARIN SODIUM 40 MG: 100 INJECTION SUBCUTANEOUS at 06:29

## 2023-05-06 RX ADMIN — HYDROMORPHONE HYDROCHLORIDE 0.5 MG: 1 INJECTION, SOLUTION INTRAMUSCULAR; INTRAVENOUS; SUBCUTANEOUS at 22:31

## 2023-05-06 RX ADMIN — HYDROMORPHONE HYDROCHLORIDE 0.5 MG: 1 INJECTION, SOLUTION INTRAMUSCULAR; INTRAVENOUS; SUBCUTANEOUS at 11:56

## 2023-05-06 RX ADMIN — POLYETHYLENE GLYCOL 3350 17 G: 17 POWDER, FOR SOLUTION ORAL at 08:39

## 2023-05-06 RX ADMIN — DIAZEPAM 5 MG: 5 TABLET ORAL at 14:02

## 2023-05-06 RX ADMIN — OXYCODONE 10 MG: 5 TABLET ORAL at 21:26

## 2023-05-06 RX ADMIN — DEXTROSE AND SODIUM CHLORIDE: 5; 450 INJECTION, SOLUTION INTRAVENOUS at 19:14

## 2023-05-06 RX ADMIN — AMLODIPINE BESYLATE 10 MG: 10 TABLET ORAL at 08:39

## 2023-05-06 RX ADMIN — OXYCODONE 10 MG: 5 TABLET ORAL at 13:02

## 2023-05-06 RX ADMIN — HYDROMORPHONE HYDROCHLORIDE 0.5 MG: 1 INJECTION, SOLUTION INTRAMUSCULAR; INTRAVENOUS; SUBCUTANEOUS at 15:13

## 2023-05-06 RX ADMIN — OXYCODONE 10 MG: 5 TABLET ORAL at 04:18

## 2023-05-06 ASSESSMENT — PAIN SCALES - GENERAL
PAINLEVEL_OUTOF10: 4
PAINLEVEL_OUTOF10: 5
PAINLEVEL_OUTOF10: 7
PAINLEVEL_OUTOF10: 7
PAINLEVEL_OUTOF10: 6
PAINLEVEL_OUTOF10: 7
PAINLEVEL_OUTOF10: 5
PAINLEVEL_OUTOF10: 8
PAINLEVEL_OUTOF10: 5
PAINLEVEL_OUTOF10: 7
PAINLEVEL_OUTOF10: 7
PAINLEVEL_OUTOF10: 8

## 2023-05-06 ASSESSMENT — PAIN DESCRIPTION - DESCRIPTORS
DESCRIPTORS: SHARP
DESCRIPTORS: CRAMPING;DISCOMFORT
DESCRIPTORS: SHARP
DESCRIPTORS: SHARP
DESCRIPTORS: ACHING
DESCRIPTORS: ACHING

## 2023-05-06 ASSESSMENT — PAIN DESCRIPTION - LOCATION
LOCATION: ABDOMEN
LOCATION: ABDOMEN;BACK
LOCATION: ABDOMEN

## 2023-05-06 ASSESSMENT — PAIN - FUNCTIONAL ASSESSMENT
PAIN_FUNCTIONAL_ASSESSMENT: ACTIVITIES ARE NOT PREVENTED

## 2023-05-06 ASSESSMENT — PAIN DESCRIPTION - ORIENTATION
ORIENTATION: MID
ORIENTATION: MID
ORIENTATION: ANTERIOR;MID
ORIENTATION: ANTERIOR;MID
ORIENTATION: ANTERIOR;MID;LOWER

## 2023-05-06 ASSESSMENT — PAIN DESCRIPTION - PAIN TYPE
TYPE: ACUTE PAIN;SURGICAL PAIN
TYPE: ACUTE PAIN;SURGICAL PAIN

## 2023-05-06 ASSESSMENT — PAIN DESCRIPTION - FREQUENCY: FREQUENCY: CONTINUOUS

## 2023-05-06 ASSESSMENT — PAIN DESCRIPTION - ONSET: ONSET: ON-GOING

## 2023-05-07 PROCEDURE — 99024 POSTOP FOLLOW-UP VISIT: CPT | Performed by: SURGERY

## 2023-05-07 PROCEDURE — 6360000002 HC RX W HCPCS: Performed by: SURGERY

## 2023-05-07 PROCEDURE — 6370000000 HC RX 637 (ALT 250 FOR IP): Performed by: NURSE PRACTITIONER

## 2023-05-07 PROCEDURE — 6370000000 HC RX 637 (ALT 250 FOR IP): Performed by: SURGERY

## 2023-05-07 PROCEDURE — 2580000003 HC RX 258: Performed by: NURSE PRACTITIONER

## 2023-05-07 PROCEDURE — 1200000000 HC SEMI PRIVATE

## 2023-05-07 RX ADMIN — OXYCODONE 10 MG: 5 TABLET ORAL at 13:53

## 2023-05-07 RX ADMIN — HYDROMORPHONE HYDROCHLORIDE 0.5 MG: 1 INJECTION, SOLUTION INTRAMUSCULAR; INTRAVENOUS; SUBCUTANEOUS at 16:38

## 2023-05-07 RX ADMIN — SIMETHICONE 80 MG: 80 TABLET, CHEWABLE ORAL at 05:36

## 2023-05-07 RX ADMIN — HYDROMORPHONE HYDROCHLORIDE 0.5 MG: 1 INJECTION, SOLUTION INTRAMUSCULAR; INTRAVENOUS; SUBCUTANEOUS at 11:57

## 2023-05-07 RX ADMIN — OXYCODONE 10 MG: 5 TABLET ORAL at 09:43

## 2023-05-07 RX ADMIN — PANTOPRAZOLE SODIUM 40 MG: 40 TABLET, DELAYED RELEASE ORAL at 05:11

## 2023-05-07 RX ADMIN — DEXTROSE AND SODIUM CHLORIDE: 5; 450 INJECTION, SOLUTION INTRAVENOUS at 13:55

## 2023-05-07 RX ADMIN — DIAZEPAM 5 MG: 5 TABLET ORAL at 05:11

## 2023-05-07 RX ADMIN — AMLODIPINE BESYLATE 10 MG: 10 TABLET ORAL at 07:26

## 2023-05-07 RX ADMIN — OXYCODONE 10 MG: 5 TABLET ORAL at 03:02

## 2023-05-07 RX ADMIN — OXYCODONE 10 MG: 5 TABLET ORAL at 19:51

## 2023-05-07 RX ADMIN — HYDROMORPHONE HYDROCHLORIDE 0.5 MG: 1 INJECTION, SOLUTION INTRAMUSCULAR; INTRAVENOUS; SUBCUTANEOUS at 07:11

## 2023-05-07 RX ADMIN — SIMETHICONE 80 MG: 80 TABLET, CHEWABLE ORAL at 15:51

## 2023-05-07 RX ADMIN — POLYETHYLENE GLYCOL 3350 17 G: 17 POWDER, FOR SOLUTION ORAL at 07:26

## 2023-05-07 RX ADMIN — NALOXEGOL OXALATE 25 MG: 25 TABLET, FILM COATED ORAL at 07:26

## 2023-05-07 RX ADMIN — HYDROMORPHONE HYDROCHLORIDE 0.5 MG: 1 INJECTION, SOLUTION INTRAMUSCULAR; INTRAVENOUS; SUBCUTANEOUS at 04:06

## 2023-05-07 RX ADMIN — ENOXAPARIN SODIUM 40 MG: 100 INJECTION SUBCUTANEOUS at 06:30

## 2023-05-07 RX ADMIN — HYDROMORPHONE HYDROCHLORIDE 0.5 MG: 1 INJECTION, SOLUTION INTRAMUSCULAR; INTRAVENOUS; SUBCUTANEOUS at 22:10

## 2023-05-07 ASSESSMENT — PAIN SCALES - GENERAL
PAINLEVEL_OUTOF10: 8
PAINLEVEL_OUTOF10: 7
PAINLEVEL_OUTOF10: 4
PAINLEVEL_OUTOF10: 7
PAINLEVEL_OUTOF10: 8
PAINLEVEL_OUTOF10: 6
PAINLEVEL_OUTOF10: 8
PAINLEVEL_OUTOF10: 7

## 2023-05-07 ASSESSMENT — PAIN DESCRIPTION - FREQUENCY
FREQUENCY: CONTINUOUS

## 2023-05-07 ASSESSMENT — PAIN DESCRIPTION - LOCATION
LOCATION: ABDOMEN

## 2023-05-07 ASSESSMENT — PAIN - FUNCTIONAL ASSESSMENT
PAIN_FUNCTIONAL_ASSESSMENT: ACTIVITIES ARE NOT PREVENTED

## 2023-05-07 ASSESSMENT — PAIN DESCRIPTION - DESCRIPTORS
DESCRIPTORS: SHARP
DESCRIPTORS: SHARP
DESCRIPTORS: ACHING;SORE
DESCRIPTORS: ACHING
DESCRIPTORS: ACHING;SORE
DESCRIPTORS: SHARP

## 2023-05-07 ASSESSMENT — PAIN DESCRIPTION - ORIENTATION
ORIENTATION: MID;ANTERIOR
ORIENTATION: MID
ORIENTATION: ANTERIOR;MID
ORIENTATION: ANTERIOR;MID

## 2023-05-07 ASSESSMENT — PAIN DESCRIPTION - ONSET
ONSET: ON-GOING

## 2023-05-07 ASSESSMENT — PAIN DESCRIPTION - PAIN TYPE
TYPE: SURGICAL PAIN

## 2023-05-08 PROCEDURE — APPSS30 APP SPLIT SHARED TIME 16-30 MINUTES: Performed by: NURSE PRACTITIONER

## 2023-05-08 PROCEDURE — 6360000002 HC RX W HCPCS: Performed by: SURGERY

## 2023-05-08 PROCEDURE — 6370000000 HC RX 637 (ALT 250 FOR IP): Performed by: SURGERY

## 2023-05-08 PROCEDURE — 99024 POSTOP FOLLOW-UP VISIT: CPT | Performed by: NURSE PRACTITIONER

## 2023-05-08 PROCEDURE — 6370000000 HC RX 637 (ALT 250 FOR IP): Performed by: NURSE PRACTITIONER

## 2023-05-08 PROCEDURE — 2580000003 HC RX 258: Performed by: NURSE PRACTITIONER

## 2023-05-08 PROCEDURE — 6360000002 HC RX W HCPCS: Performed by: NURSE PRACTITIONER

## 2023-05-08 PROCEDURE — 1200000000 HC SEMI PRIVATE

## 2023-05-08 RX ORDER — METOCLOPRAMIDE HYDROCHLORIDE 5 MG/ML
10 INJECTION INTRAMUSCULAR; INTRAVENOUS EVERY 8 HOURS
Status: DISCONTINUED | OUTPATIENT
Start: 2023-05-08 | End: 2023-05-11 | Stop reason: HOSPADM

## 2023-05-08 RX ADMIN — NALOXEGOL OXALATE 25 MG: 25 TABLET, FILM COATED ORAL at 09:38

## 2023-05-08 RX ADMIN — HYDROMORPHONE HYDROCHLORIDE 0.5 MG: 1 INJECTION, SOLUTION INTRAMUSCULAR; INTRAVENOUS; SUBCUTANEOUS at 07:40

## 2023-05-08 RX ADMIN — AMLODIPINE BESYLATE 10 MG: 10 TABLET ORAL at 09:38

## 2023-05-08 RX ADMIN — OXYCODONE 10 MG: 5 TABLET ORAL at 16:07

## 2023-05-08 RX ADMIN — HYDROMORPHONE HYDROCHLORIDE 0.5 MG: 1 INJECTION, SOLUTION INTRAMUSCULAR; INTRAVENOUS; SUBCUTANEOUS at 22:47

## 2023-05-08 RX ADMIN — POLYETHYLENE GLYCOL 3350 17 G: 17 POWDER, FOR SOLUTION ORAL at 09:38

## 2023-05-08 RX ADMIN — OXYCODONE 10 MG: 5 TABLET ORAL at 11:35

## 2023-05-08 RX ADMIN — METOCLOPRAMIDE 10 MG: 5 INJECTION, SOLUTION INTRAMUSCULAR; INTRAVENOUS at 11:35

## 2023-05-08 RX ADMIN — DEXTROSE AND SODIUM CHLORIDE: 5; 450 INJECTION, SOLUTION INTRAVENOUS at 09:41

## 2023-05-08 RX ADMIN — OXYCODONE 10 MG: 5 TABLET ORAL at 20:37

## 2023-05-08 RX ADMIN — HYDROMORPHONE HYDROCHLORIDE 0.5 MG: 1 INJECTION, SOLUTION INTRAMUSCULAR; INTRAVENOUS; SUBCUTANEOUS at 18:52

## 2023-05-08 RX ADMIN — METOCLOPRAMIDE 10 MG: 5 INJECTION, SOLUTION INTRAMUSCULAR; INTRAVENOUS at 17:42

## 2023-05-08 RX ADMIN — PANTOPRAZOLE SODIUM 40 MG: 40 TABLET, DELAYED RELEASE ORAL at 06:29

## 2023-05-08 RX ADMIN — HYDROMORPHONE HYDROCHLORIDE 0.5 MG: 1 INJECTION, SOLUTION INTRAMUSCULAR; INTRAVENOUS; SUBCUTANEOUS at 14:12

## 2023-05-08 RX ADMIN — ENOXAPARIN SODIUM 40 MG: 100 INJECTION SUBCUTANEOUS at 06:30

## 2023-05-08 ASSESSMENT — PAIN DESCRIPTION - DESCRIPTORS
DESCRIPTORS: ACHING
DESCRIPTORS: ACHING;SHARP
DESCRIPTORS: ACHING;TENDER
DESCRIPTORS: STABBING

## 2023-05-08 ASSESSMENT — PAIN DESCRIPTION - LOCATION
LOCATION: ABDOMEN

## 2023-05-08 ASSESSMENT — PAIN DESCRIPTION - FREQUENCY: FREQUENCY: CONTINUOUS

## 2023-05-08 ASSESSMENT — PAIN SCALES - GENERAL
PAINLEVEL_OUTOF10: 7
PAINLEVEL_OUTOF10: 8
PAINLEVEL_OUTOF10: 8
PAINLEVEL_OUTOF10: 7

## 2023-05-08 ASSESSMENT — PAIN DESCRIPTION - ORIENTATION
ORIENTATION: MID

## 2023-05-08 ASSESSMENT — PAIN SCALES - WONG BAKER
WONGBAKER_NUMERICALRESPONSE: 0
WONGBAKER_NUMERICALRESPONSE: 2

## 2023-05-08 ASSESSMENT — PAIN DESCRIPTION - PAIN TYPE: TYPE: SURGICAL PAIN

## 2023-05-08 ASSESSMENT — PAIN DESCRIPTION - ONSET: ONSET: ON-GOING

## 2023-05-08 ASSESSMENT — PAIN - FUNCTIONAL ASSESSMENT: PAIN_FUNCTIONAL_ASSESSMENT: ACTIVITIES ARE NOT PREVENTED

## 2023-05-08 NOTE — CARE COORDINATION
5/8/23, 11:30 AM EDT    DISCHARGE ON GOING EVALUATION    [facility] day: 6  Location: -/058-A Reason for admit: Small bowel obstruction ([facility] [address] [J76.627]  SBO (small bowel obstruction) ([facility] [address] [T22.697]   Procedure:   5/2 CXR 1. No interval change since previous study dated 2/18/2010, no acute cardiopulmonary disease. 5/3 CT Abdomen Pelvis W IV Contrast 1. Dilated fluid-filled small bowel loops consistent with a small bowel obstruction. 2. Ventral hernia containing transverse colon. 3. Status post cholecystectomy, hysterectomy and possible postoperative changes involving the colon. .   4. Small hiatal hernia. 5. Lumbar spondylosis. 6. Atherosclerotic calcifications abdominal aorta and iliac arteries. 5/4 EXPLORATORY LAPAROTOMY WITH SMALL BOWEL RESECTION, LYSIS OF ADHESIONS, REMOVAL OF MESH. PRIMARY REPAIR OF RECURRENT VENTRAL HERNIA    Barriers to Discharge: Easy to chew diet. Pain and nausea control, increase ambulation, abdominal binder when up, IV fluids, Lovenox, Reglan, Movantik. PCP: Sohail Bonilla DO  Readmission Risk Score: 3.8%  Patient Goals/Plan/Treatment Preferences: Plans home with spouse. Denies needs. Will follow.

## 2023-05-09 LAB
ANION GAP SERPL CALC-SCNC: 13 MEQ/L (ref 8–16)
BUN SERPL-MCNC: 5 MG/DL (ref 7–22)
CALCIUM SERPL-MCNC: 9.3 MG/DL (ref 8.5–10.5)
CHLORIDE SERPL-SCNC: 100 MEQ/L (ref 98–111)
CO2 SERPL-SCNC: 29 MEQ/L (ref 23–33)
CREAT SERPL-MCNC: 0.4 MG/DL (ref 0.4–1.2)
DEPRECATED RDW RBC AUTO: 46.9 FL (ref 35–45)
ERYTHROCYTE [DISTWIDTH] IN BLOOD BY AUTOMATED COUNT: 13.8 % (ref 11.5–14.5)
GFR SERPL CREATININE-BSD FRML MDRD: > 60 ML/MIN/1.73M2
GLUCOSE SERPL-MCNC: 99 MG/DL (ref 70–108)
HCT VFR BLD AUTO: 44.3 % (ref 37–47)
HGB BLD-MCNC: 14.2 GM/DL (ref 12–16)
MCH RBC QN AUTO: 29.5 PG (ref 26–33)
MCHC RBC AUTO-ENTMCNC: 32.1 GM/DL (ref 32.2–35.5)
MCV RBC AUTO: 92.1 FL (ref 81–99)
PLATELET # BLD AUTO: 355 THOU/MM3 (ref 130–400)
PMV BLD AUTO: 10.6 FL (ref 9.4–12.4)
POTASSIUM SERPL-SCNC: 4.1 MEQ/L (ref 3.5–5.2)
RBC # BLD AUTO: 4.81 MILL/MM3 (ref 4.2–5.4)
SODIUM SERPL-SCNC: 142 MEQ/L (ref 135–145)
WBC # BLD AUTO: 7.8 THOU/MM3 (ref 4.8–10.8)

## 2023-05-09 PROCEDURE — 6370000000 HC RX 637 (ALT 250 FOR IP): Performed by: SURGERY

## 2023-05-09 PROCEDURE — 1200000000 HC SEMI PRIVATE

## 2023-05-09 PROCEDURE — 6370000000 HC RX 637 (ALT 250 FOR IP): Performed by: NURSE PRACTITIONER

## 2023-05-09 PROCEDURE — 6360000002 HC RX W HCPCS: Performed by: SURGERY

## 2023-05-09 PROCEDURE — 6360000002 HC RX W HCPCS: Performed by: NURSE PRACTITIONER

## 2023-05-09 PROCEDURE — 80048 BASIC METABOLIC PNL TOTAL CA: CPT

## 2023-05-09 PROCEDURE — 99024 POSTOP FOLLOW-UP VISIT: CPT | Performed by: NURSE PRACTITIONER

## 2023-05-09 PROCEDURE — 36415 COLL VENOUS BLD VENIPUNCTURE: CPT

## 2023-05-09 PROCEDURE — 2580000003 HC RX 258: Performed by: SURGERY

## 2023-05-09 PROCEDURE — 85027 COMPLETE CBC AUTOMATED: CPT

## 2023-05-09 PROCEDURE — 2580000003 HC RX 258: Performed by: NURSE PRACTITIONER

## 2023-05-09 PROCEDURE — APPSS30 APP SPLIT SHARED TIME 16-30 MINUTES: Performed by: NURSE PRACTITIONER

## 2023-05-09 RX ORDER — KETOROLAC TROMETHAMINE 30 MG/ML
30 INJECTION, SOLUTION INTRAMUSCULAR; INTRAVENOUS EVERY 6 HOURS
Status: DISCONTINUED | OUTPATIENT
Start: 2023-05-09 | End: 2023-05-11 | Stop reason: HOSPADM

## 2023-05-09 RX ORDER — BISACODYL 10 MG
10 SUPPOSITORY, RECTAL RECTAL DAILY
Status: DISCONTINUED | OUTPATIENT
Start: 2023-05-09 | End: 2023-05-11 | Stop reason: HOSPADM

## 2023-05-09 RX ADMIN — OXYCODONE 10 MG: 5 TABLET ORAL at 10:20

## 2023-05-09 RX ADMIN — BISACODYL 10 MG: 10 SUPPOSITORY RECTAL at 10:23

## 2023-05-09 RX ADMIN — KETOROLAC TROMETHAMINE 30 MG: 30 INJECTION, SOLUTION INTRAMUSCULAR; INTRAVENOUS at 17:48

## 2023-05-09 RX ADMIN — METOCLOPRAMIDE 10 MG: 5 INJECTION, SOLUTION INTRAMUSCULAR; INTRAVENOUS at 17:48

## 2023-05-09 RX ADMIN — HYDROMORPHONE HYDROCHLORIDE 0.5 MG: 1 INJECTION, SOLUTION INTRAMUSCULAR; INTRAVENOUS; SUBCUTANEOUS at 03:34

## 2023-05-09 RX ADMIN — KETOROLAC TROMETHAMINE 30 MG: 30 INJECTION, SOLUTION INTRAMUSCULAR; INTRAVENOUS at 22:37

## 2023-05-09 RX ADMIN — DEXTROSE AND SODIUM CHLORIDE: 5; 450 INJECTION, SOLUTION INTRAVENOUS at 03:36

## 2023-05-09 RX ADMIN — DIAZEPAM 5 MG: 5 TABLET ORAL at 10:20

## 2023-05-09 RX ADMIN — OXYCODONE 10 MG: 5 TABLET ORAL at 05:50

## 2023-05-09 RX ADMIN — POLYETHYLENE GLYCOL 3350 17 G: 17 POWDER, FOR SOLUTION ORAL at 07:45

## 2023-05-09 RX ADMIN — METOCLOPRAMIDE 10 MG: 5 INJECTION, SOLUTION INTRAMUSCULAR; INTRAVENOUS at 00:34

## 2023-05-09 RX ADMIN — SODIUM CHLORIDE, PRESERVATIVE FREE 10 ML: 5 INJECTION INTRAVENOUS at 19:46

## 2023-05-09 RX ADMIN — OXYCODONE 10 MG: 5 TABLET ORAL at 15:30

## 2023-05-09 RX ADMIN — OXYCODONE 10 MG: 5 TABLET ORAL at 23:40

## 2023-05-09 RX ADMIN — OXYCODONE 10 MG: 5 TABLET ORAL at 00:31

## 2023-05-09 RX ADMIN — NALOXEGOL OXALATE 25 MG: 25 TABLET, FILM COATED ORAL at 07:45

## 2023-05-09 RX ADMIN — METOCLOPRAMIDE 10 MG: 5 INJECTION, SOLUTION INTRAMUSCULAR; INTRAVENOUS at 10:18

## 2023-05-09 RX ADMIN — AMLODIPINE BESYLATE 10 MG: 10 TABLET ORAL at 07:45

## 2023-05-09 RX ADMIN — OXYCODONE 10 MG: 5 TABLET ORAL at 19:46

## 2023-05-09 RX ADMIN — KETOROLAC TROMETHAMINE 30 MG: 30 INJECTION, SOLUTION INTRAMUSCULAR; INTRAVENOUS at 11:07

## 2023-05-09 RX ADMIN — PANTOPRAZOLE SODIUM 40 MG: 40 TABLET, DELAYED RELEASE ORAL at 05:51

## 2023-05-09 RX ADMIN — HYDROMORPHONE HYDROCHLORIDE 0.5 MG: 1 INJECTION, SOLUTION INTRAMUSCULAR; INTRAVENOUS; SUBCUTANEOUS at 07:44

## 2023-05-09 RX ADMIN — DIAZEPAM 5 MG: 5 TABLET ORAL at 18:10

## 2023-05-09 RX ADMIN — ENOXAPARIN SODIUM 40 MG: 100 INJECTION SUBCUTANEOUS at 06:35

## 2023-05-09 ASSESSMENT — PAIN DESCRIPTION - PAIN TYPE
TYPE: SURGICAL PAIN
TYPE: SURGICAL PAIN

## 2023-05-09 ASSESSMENT — PAIN SCALES - GENERAL
PAINLEVEL_OUTOF10: 7
PAINLEVEL_OUTOF10: 4
PAINLEVEL_OUTOF10: 8
PAINLEVEL_OUTOF10: 7
PAINLEVEL_OUTOF10: 6
PAINLEVEL_OUTOF10: 7
PAINLEVEL_OUTOF10: 5
PAINLEVEL_OUTOF10: 8
PAINLEVEL_OUTOF10: 7
PAINLEVEL_OUTOF10: 8
PAINLEVEL_OUTOF10: 7
PAINLEVEL_OUTOF10: 0
PAINLEVEL_OUTOF10: 8
PAINLEVEL_OUTOF10: 7

## 2023-05-09 ASSESSMENT — PAIN DESCRIPTION - DESCRIPTORS
DESCRIPTORS: TENDER;ACHING
DESCRIPTORS: SQUEEZING
DESCRIPTORS: TENDER;ACHING

## 2023-05-09 ASSESSMENT — PAIN DESCRIPTION - ORIENTATION
ORIENTATION: MID
ORIENTATION: MID

## 2023-05-09 ASSESSMENT — PAIN DESCRIPTION - LOCATION
LOCATION: ABDOMEN

## 2023-05-09 ASSESSMENT — PAIN DESCRIPTION - FREQUENCY
FREQUENCY: CONTINUOUS
FREQUENCY: CONTINUOUS

## 2023-05-09 ASSESSMENT — PAIN DESCRIPTION - ONSET
ONSET: ON-GOING
ONSET: ON-GOING

## 2023-05-09 NOTE — CARE COORDINATION
5/9/23, 8:44 AM EDT    DISCHARGE ON GOING EVALUATION    [facility] day: 7  Location: 6E-58/058-A Reason for admit: Small bowel obstruction ([facility] [address] [I67.734]  SBO (small bowel obstruction) ([facility] [address] [K42.915]   Procedure:   5/2 CXR 1. No interval change since previous study dated 2/18/2010, no acute cardiopulmonary disease. 5/3 CT Abdomen Pelvis W IV Contrast 1. Dilated fluid-filled small bowel loops consistent with a small bowel obstruction. 2. Ventral hernia containing transverse colon. 3. Status post cholecystectomy, hysterectomy and possible postoperative changes involving the colon. .   4. Small hiatal hernia. 5. Lumbar spondylosis. 6. Atherosclerotic calcifications abdominal aorta and iliac arteries. 5/4 EXPLORATORY LAPAROTOMY WITH SMALL BOWEL RESECTION, LYSIS OF ADHESIONS, REMOVAL OF MESH. PRIMARY REPAIR OF RECURRENT VENTRAL HERNIA     Barriers to Discharge: Pain and nausea control, easy to chew diet, Norvasc, Lovenox, IV Reglan, Movantik, Protonix. PCP: Lynda Navarro DO  Readmission Risk Score: 3.5%  Patient Goals/Plan/Treatment Preferences: Plans home with spouse. Denies needs. Will follow.

## 2023-05-10 PROCEDURE — 6360000002 HC RX W HCPCS: Performed by: SURGERY

## 2023-05-10 PROCEDURE — 99024 POSTOP FOLLOW-UP VISIT: CPT | Performed by: NURSE PRACTITIONER

## 2023-05-10 PROCEDURE — 2580000003 HC RX 258: Performed by: SURGERY

## 2023-05-10 PROCEDURE — 6370000000 HC RX 637 (ALT 250 FOR IP): Performed by: SURGERY

## 2023-05-10 PROCEDURE — 6370000000 HC RX 637 (ALT 250 FOR IP): Performed by: NURSE PRACTITIONER

## 2023-05-10 PROCEDURE — 6360000002 HC RX W HCPCS: Performed by: NURSE PRACTITIONER

## 2023-05-10 PROCEDURE — 1200000000 HC SEMI PRIVATE

## 2023-05-10 PROCEDURE — APPSS30 APP SPLIT SHARED TIME 16-30 MINUTES: Performed by: NURSE PRACTITIONER

## 2023-05-10 RX ADMIN — POLYETHYLENE GLYCOL 3350 17 G: 17 POWDER, FOR SOLUTION ORAL at 07:55

## 2023-05-10 RX ADMIN — OXYCODONE 10 MG: 5 TABLET ORAL at 21:37

## 2023-05-10 RX ADMIN — PANTOPRAZOLE SODIUM 40 MG: 40 TABLET, DELAYED RELEASE ORAL at 06:32

## 2023-05-10 RX ADMIN — KETOROLAC TROMETHAMINE 30 MG: 30 INJECTION, SOLUTION INTRAMUSCULAR; INTRAVENOUS at 22:59

## 2023-05-10 RX ADMIN — KETOROLAC TROMETHAMINE 30 MG: 30 INJECTION, SOLUTION INTRAMUSCULAR; INTRAVENOUS at 09:49

## 2023-05-10 RX ADMIN — METOCLOPRAMIDE 10 MG: 5 INJECTION, SOLUTION INTRAMUSCULAR; INTRAVENOUS at 17:08

## 2023-05-10 RX ADMIN — METOCLOPRAMIDE 10 MG: 5 INJECTION, SOLUTION INTRAMUSCULAR; INTRAVENOUS at 09:49

## 2023-05-10 RX ADMIN — SODIUM CHLORIDE, PRESERVATIVE FREE 10 ML: 5 INJECTION INTRAVENOUS at 20:36

## 2023-05-10 RX ADMIN — KETOROLAC TROMETHAMINE 30 MG: 30 INJECTION, SOLUTION INTRAMUSCULAR; INTRAVENOUS at 16:14

## 2023-05-10 RX ADMIN — NALOXEGOL OXALATE 25 MG: 25 TABLET, FILM COATED ORAL at 07:56

## 2023-05-10 RX ADMIN — OXYCODONE 10 MG: 5 TABLET ORAL at 13:02

## 2023-05-10 RX ADMIN — SODIUM CHLORIDE, PRESERVATIVE FREE 10 ML: 5 INJECTION INTRAVENOUS at 07:56

## 2023-05-10 RX ADMIN — DIAZEPAM 5 MG: 5 TABLET ORAL at 20:36

## 2023-05-10 RX ADMIN — METOCLOPRAMIDE 10 MG: 5 INJECTION, SOLUTION INTRAMUSCULAR; INTRAVENOUS at 03:48

## 2023-05-10 RX ADMIN — OXYCODONE 10 MG: 5 TABLET ORAL at 17:08

## 2023-05-10 RX ADMIN — KETOROLAC TROMETHAMINE 30 MG: 30 INJECTION, SOLUTION INTRAMUSCULAR; INTRAVENOUS at 03:48

## 2023-05-10 RX ADMIN — AMLODIPINE BESYLATE 10 MG: 10 TABLET ORAL at 07:56

## 2023-05-10 RX ADMIN — DIAZEPAM 5 MG: 5 TABLET ORAL at 07:56

## 2023-05-10 RX ADMIN — ENOXAPARIN SODIUM 40 MG: 100 INJECTION SUBCUTANEOUS at 06:32

## 2023-05-10 RX ADMIN — BISACODYL 10 MG: 10 SUPPOSITORY RECTAL at 07:56

## 2023-05-10 RX ADMIN — OXYCODONE 10 MG: 5 TABLET ORAL at 06:32

## 2023-05-10 ASSESSMENT — PAIN DESCRIPTION - ORIENTATION
ORIENTATION: MID
ORIENTATION: LOWER;LEFT;RIGHT
ORIENTATION: MID
ORIENTATION: MID;LOWER
ORIENTATION: LOWER;MID;ANTERIOR
ORIENTATION: RIGHT;LEFT;MID
ORIENTATION: MID

## 2023-05-10 ASSESSMENT — PAIN DESCRIPTION - DESCRIPTORS
DESCRIPTORS: ACHING
DESCRIPTORS: ACHING
DESCRIPTORS: SHARP;SORE;STABBING
DESCRIPTORS: SQUEEZING;CRAMPING
DESCRIPTORS: SQUEEZING
DESCRIPTORS: ACHING;SQUEEZING
DESCRIPTORS: ACHING;TENDER

## 2023-05-10 ASSESSMENT — PAIN DESCRIPTION - ONSET
ONSET: ON-GOING
ONSET: ON-GOING

## 2023-05-10 ASSESSMENT — PAIN DESCRIPTION - LOCATION
LOCATION: ABDOMEN

## 2023-05-10 ASSESSMENT — PAIN SCALES - GENERAL
PAINLEVEL_OUTOF10: 7
PAINLEVEL_OUTOF10: 5
PAINLEVEL_OUTOF10: 3
PAINLEVEL_OUTOF10: 7
PAINLEVEL_OUTOF10: 8
PAINLEVEL_OUTOF10: 6
PAINLEVEL_OUTOF10: 6
PAINLEVEL_OUTOF10: 7
PAINLEVEL_OUTOF10: 6
PAINLEVEL_OUTOF10: 5
PAINLEVEL_OUTOF10: 7
PAINLEVEL_OUTOF10: 4

## 2023-05-10 ASSESSMENT — PAIN DESCRIPTION - FREQUENCY
FREQUENCY: INTERMITTENT
FREQUENCY: CONTINUOUS

## 2023-05-10 ASSESSMENT — PAIN - FUNCTIONAL ASSESSMENT
PAIN_FUNCTIONAL_ASSESSMENT: ACTIVITIES ARE NOT PREVENTED

## 2023-05-10 ASSESSMENT — PAIN DESCRIPTION - PAIN TYPE
TYPE: ACUTE PAIN
TYPE: ACUTE PAIN

## 2023-05-10 NOTE — CARE COORDINATION
5/10/23, 11:25 AM EDT    DISCHARGE ON GOING EVALUATION    [facility] day: 8  Location: -58/058-A Reason for admit: Small bowel obstruction ([facility] [address] [F96.354]  SBO (small bowel obstruction) ([facility] [address] [T98.895]   Procedure:   5/2 CXR 1. No interval change since previous study dated 2/18/2010, no acute cardiopulmonary disease. 5/3 CT Abdomen Pelvis W IV Contrast 1. Dilated fluid-filled small bowel loops consistent with a small bowel obstruction. 2. Ventral hernia containing transverse colon. 3. Status post cholecystectomy, hysterectomy and possible postoperative changes involving the colon. .   4. Small hiatal hernia. 5. Lumbar spondylosis. 6. Atherosclerotic calcifications abdominal aorta and iliac arteries. 5/4 EXPLORATORY LAPAROTOMY WITH SMALL BOWEL RESECTION, LYSIS OF ADHESIONS, REMOVAL OF MESH. PRIMARY REPAIR OF RECURRENT VENTRAL HERNIA   5/10 SBFT pending  Barriers to Discharge: pain and nausea control, Dulcolax suppository daily, Lovenox, IV Reglan, Protonix, Movantik. PCP: Ga Espinoza DO  Readmission Risk Score: 6.2%  Patient Goals/Plan/Treatment Preferences: Plans home with spouse. Denies needs. Will follow.

## 2023-05-11 ENCOUNTER — APPOINTMENT (OUTPATIENT)
Dept: GENERAL RADIOLOGY | Age: 58
DRG: 660 | End: 2023-05-11
Payer: MEDICARE

## 2023-05-11 VITALS
SYSTOLIC BLOOD PRESSURE: 130 MMHG | WEIGHT: 216.5 LBS | DIASTOLIC BLOOD PRESSURE: 68 MMHG | OXYGEN SATURATION: 96 % | HEIGHT: 64 IN | TEMPERATURE: 97.8 F | BODY MASS INDEX: 36.96 KG/M2 | HEART RATE: 70 BPM | RESPIRATION RATE: 18 BRPM

## 2023-05-11 PROBLEM — K56.609 SBO (SMALL BOWEL OBSTRUCTION) (HCC): Status: ACTIVE | Noted: 2023-05-11

## 2023-05-11 PROBLEM — K56.609 SBO (SMALL BOWEL OBSTRUCTION) (HCC): Status: RESOLVED | Noted: 2023-05-11 | Resolved: 2023-05-11

## 2023-05-11 PROCEDURE — 6370000000 HC RX 637 (ALT 250 FOR IP): Performed by: SURGERY

## 2023-05-11 PROCEDURE — 2500000003 HC RX 250 WO HCPCS: Performed by: NURSE PRACTITIONER

## 2023-05-11 PROCEDURE — 99024 POSTOP FOLLOW-UP VISIT: CPT | Performed by: NURSE PRACTITIONER

## 2023-05-11 PROCEDURE — 99239 HOSP IP/OBS DSCHRG MGMT >30: CPT | Performed by: NURSE PRACTITIONER

## 2023-05-11 PROCEDURE — 6360000002 HC RX W HCPCS: Performed by: NURSE PRACTITIONER

## 2023-05-11 PROCEDURE — 6370000000 HC RX 637 (ALT 250 FOR IP): Performed by: NURSE PRACTITIONER

## 2023-05-11 PROCEDURE — 6360000002 HC RX W HCPCS: Performed by: SURGERY

## 2023-05-11 PROCEDURE — 74250 X-RAY XM SM INT 1CNTRST STD: CPT

## 2023-05-11 PROCEDURE — APPSS30 APP SPLIT SHARED TIME 16-30 MINUTES: Performed by: NURSE PRACTITIONER

## 2023-05-11 RX ORDER — KETOROLAC TROMETHAMINE 10 MG/1
10 TABLET, FILM COATED ORAL EVERY 6 HOURS PRN
Qty: 12 TABLET | Refills: 0 | Status: SHIPPED | OUTPATIENT
Start: 2023-05-11 | End: 2023-05-14

## 2023-05-11 RX ORDER — OXYCODONE HYDROCHLORIDE 5 MG/1
5 TABLET ORAL EVERY 6 HOURS PRN
Qty: 16 TABLET | Refills: 0 | Status: SHIPPED | OUTPATIENT
Start: 2023-05-11 | End: 2023-05-18

## 2023-05-11 RX ADMIN — OXYCODONE 10 MG: 5 TABLET ORAL at 11:44

## 2023-05-11 RX ADMIN — METOCLOPRAMIDE 10 MG: 5 INJECTION, SOLUTION INTRAMUSCULAR; INTRAVENOUS at 10:32

## 2023-05-11 RX ADMIN — ENOXAPARIN SODIUM 40 MG: 100 INJECTION SUBCUTANEOUS at 06:34

## 2023-05-11 RX ADMIN — AMLODIPINE BESYLATE 10 MG: 10 TABLET ORAL at 10:32

## 2023-05-11 RX ADMIN — BARIUM SULFATE 600 ML: 240 SUSPENSION ORAL at 09:06

## 2023-05-11 RX ADMIN — OXYCODONE 10 MG: 5 TABLET ORAL at 02:02

## 2023-05-11 RX ADMIN — PANTOPRAZOLE SODIUM 40 MG: 40 TABLET, DELAYED RELEASE ORAL at 05:07

## 2023-05-11 RX ADMIN — KETOROLAC TROMETHAMINE 30 MG: 30 INJECTION, SOLUTION INTRAMUSCULAR; INTRAVENOUS at 10:32

## 2023-05-11 RX ADMIN — DIAZEPAM 5 MG: 5 TABLET ORAL at 03:41

## 2023-05-11 RX ADMIN — METOCLOPRAMIDE 10 MG: 5 INJECTION, SOLUTION INTRAMUSCULAR; INTRAVENOUS at 02:03

## 2023-05-11 RX ADMIN — KETOROLAC TROMETHAMINE 30 MG: 30 INJECTION, SOLUTION INTRAMUSCULAR; INTRAVENOUS at 05:07

## 2023-05-11 RX ADMIN — POLYETHYLENE GLYCOL 3350 17 G: 17 POWDER, FOR SOLUTION ORAL at 12:29

## 2023-05-11 ASSESSMENT — PAIN DESCRIPTION - LOCATION
LOCATION: ABDOMEN

## 2023-05-11 ASSESSMENT — PAIN DESCRIPTION - PAIN TYPE: TYPE: SURGICAL PAIN

## 2023-05-11 ASSESSMENT — PAIN DESCRIPTION - DESCRIPTORS
DESCRIPTORS: ACHING;SHARP;SHOOTING
DESCRIPTORS: ACHING;SQUEEZING
DESCRIPTORS: SQUEEZING

## 2023-05-11 ASSESSMENT — PAIN SCALES - GENERAL
PAINLEVEL_OUTOF10: 5
PAINLEVEL_OUTOF10: 6
PAINLEVEL_OUTOF10: 8
PAINLEVEL_OUTOF10: 5
PAINLEVEL_OUTOF10: 7
PAINLEVEL_OUTOF10: 6

## 2023-05-11 ASSESSMENT — PAIN DESCRIPTION - FREQUENCY: FREQUENCY: INTERMITTENT

## 2023-05-11 ASSESSMENT — PAIN DESCRIPTION - ORIENTATION
ORIENTATION: MID
ORIENTATION: LEFT;RIGHT;LOWER
ORIENTATION: RIGHT;LEFT;MID

## 2023-05-11 ASSESSMENT — PAIN DESCRIPTION - ONSET: ONSET: ON-GOING

## 2023-05-11 NOTE — CARE COORDINATION
5/11/23, 8:21 AM EDT    DISCHARGE ON GOING EVALUATION    [facility] day: 9  Location: -/058-A Reason for admit: Small bowel obstruction ([facility] [address] [V61.212]  SBO (small bowel obstruction) ([facility] [address] [V54.716]   Procedure:   5/2 CXR 1. No interval change since previous study dated 2/18/2010, no acute cardiopulmonary disease. 5/3 CT Abdomen Pelvis W IV Contrast 1. Dilated fluid-filled small bowel loops consistent with a small bowel obstruction. 2. Ventral hernia containing transverse colon. 3. Status post cholecystectomy, hysterectomy and possible postoperative changes involving the colon. .   4. Small hiatal hernia. 5. Lumbar spondylosis. 6. Atherosclerotic calcifications abdominal aorta and iliac arteries. 5/4 EXPLORATORY LAPAROTOMY WITH SMALL BOWEL RESECTION, LYSIS OF ADHESIONS, REMOVAL OF MESH. PRIMARY REPAIR OF RECURRENT VENTRAL HERNIA   5/11 SBFT pending  Barriers to Discharge: NPO.  pain and nausea control, Dulcolax suppository daily, Lovenox, IV Reglan, Protonix, Movantik. PCP: Modesto Isaacs DO  Readmission Risk Score: 5.7%  Patient Goals/Plan/Treatment Preferences:  Plans home with spouse. Denies needs. Will follow.

## 2023-05-11 NOTE — DISCHARGE SUMMARY
Discharge Summary     Patient Identification:  Manan Piedra  : 1965  MRN: [id number]   Account: [de-identified]     Admit date: 2023  Discharge date: 2023   Attending provider: Columba Olea MD        Primary care provider: Domenico Gaffney DO     Discharge Diagnoses:   Principal Problem (Resolved):    Small bowel obstruction [facility])  Active Problems:    S/P small bowel resection       Hospital Course:   Manan Piedra is a 62 y.o. female admitted to [address] [place] [address] on 2023 for small bowel obstruction and ventral hernia with transverse colon. she was taken to the operative suite per Troy Gage MD and the planned procedure was performed as noted above. She was admitted to [address] [place] [address] for postoperative care and was initially managed with bowel rest, analgesics for pain control, IV fluid hydration, GI and DVT prophylaxis. Over the hospital stay she slowly  improved in ability to tolerate increasing levels of activity and to take po fluids, solid foods with evidence of returning bowel function, and spontaneously voiding. At the time of discharge she was able to tolerate pain with oral analgesic and was medically stable. Procedures:   Date of Procedure: 5/3/2023     Pre-Op Diagnosis Codes:     * Small bowel obstruction ([facility] [address] [Z48.824]     Post-Op Diagnosis:   Intra abdominal adhesions secondary to previously placed mesh  Small bowel obstruction  Recurrent ventral hernia       Procedure(s):  EXPLORATORY LAPAROTOMY WITH SMALL BOWEL RESECTION, LYSIS OF ADHESIONS, REMOVAL OF MESH. PRIMARY REPAIR OF RECURRENT VENTRAL HERNIA     Surgeon(s):   Columba Olea MD     Assistant:   * No surgical staff found *     Anesthesia: General     Estimated Blood Loss (mL): 50 ml     Complications: None     Specimens:   ID Type Source Tests Collected by Time Destination   A : 1) PORTION OF EXPLANTED MESH AND PORTION OF SMALL BOWEL FOR ROUTINE PATHOLOGY Tissue Abdomen SURGICAL PATHOLOGY [place] De [place]

## 2023-05-11 NOTE — PLAN OF CARE
Problem: Discharge Planning  Goal: Discharge to home or other facility with appropriate resources  5/10/2023 0030 by Jocelin Perez RN  Outcome: Progressing  Flowsheets (Taken 5/10/2023 0030)  Discharge to home or other facility with appropriate resources:   Identify barriers to discharge with patient and caregiver   Arrange for needed discharge resources and transportation as appropriate   Identify discharge learning needs (meds, wound care, etc)     Problem: Pain  Goal: Verbalizes/displays adequate comfort level or baseline comfort level  5/10/2023 0030 by Jocelin Perez RN  Outcome: Progressing  Flowsheets (Taken 5/10/2023 0030)  Verbalizes/displays adequate comfort level or baseline comfort level:   Encourage patient to monitor pain and request assistance   Assess pain using appropriate pain scale   Administer analgesics based on type and severity of pain and evaluate response   Implement non-pharmacological measures as appropriate and evaluate response     Problem: ABCDS Injury Assessment  Goal: Absence of physical injury  5/10/2023 0030 by Jocelin Perez RN  Outcome: Progressing  Flowsheets (Taken 5/10/2023 0030)  Absence of Physical Injury: Implement safety measures based on patient assessment     Problem: Gastrointestinal - Adult  Goal: Maintains or returns to baseline bowel function  5/10/2023 0030 by Jocelin Perez RN  Outcome: Progressing  Flowsheets (Taken 5/10/2023 0030)  Maintains or returns to baseline bowel function:   Assess bowel function   Encourage mobilization and activity   Administer ordered medications as needed   Encourage oral fluids to ensure adequate hydration     Problem: Genitourinary - Adult  Goal: Absence of urinary retention  5/10/2023 0030 by Jocelin Perez RN  Outcome: Progressing  Flowsheets (Taken 5/10/2023 0030)  Absence of urinary retention:   Assess patients ability to void and empty bladder   Monitor intake/output and perform bladder scan as needed     Problem: Infection -
Problem: Discharge Planning  Goal: Discharge to home or other facility with appropriate resources  5/10/2023 1419 by Tutu Noguera RN  Outcome: Progressing  Flowsheets (Taken 5/10/2023 0030 by Jocelin Perez RN)  Discharge to home or other facility with appropriate resources:   Identify barriers to discharge with patient and caregiver   Arrange for needed discharge resources and transportation as appropriate   Identify discharge learning needs (meds, wound care, etc)  5/10/2023 0030 by Jocelin Perez RN  Outcome: Progressing  Flowsheets (Taken 5/10/2023 0030)  Discharge to home or other facility with appropriate resources:   Identify barriers to discharge with patient and caregiver   Arrange for needed discharge resources and transportation as appropriate   Identify discharge learning needs (meds, wound care, etc)     Problem: Pain  Goal: Verbalizes/displays adequate comfort level or baseline comfort level  5/10/2023 1419 by Tutu Noguera RN  Outcome: Progressing  Flowsheets (Taken 5/10/2023 0030 by Jocelin Perez RN)  Verbalizes/displays adequate comfort level or baseline comfort level:   Encourage patient to monitor pain and request assistance   Assess pain using appropriate pain scale   Administer analgesics based on type and severity of pain and evaluate response   Implement non-pharmacological measures as appropriate and evaluate response  5/10/2023 0030 by Jocelin Perez RN  Outcome: Progressing  Flowsheets (Taken 5/10/2023 0030)  Verbalizes/displays adequate comfort level or baseline comfort level:   Encourage patient to monitor pain and request assistance   Assess pain using appropriate pain scale   Administer analgesics based on type and severity of pain and evaluate response   Implement non-pharmacological measures as appropriate and evaluate response     Problem: ABCDS Injury Assessment  Goal: Absence of physical injury  5/10/2023 1419 by Tutu Noguera RN  Outcome: Progressing  Flowsheets
Problem: Discharge Planning  Goal: Discharge to home or other facility with appropriate resources  5/11/2023 0118 by Zoe Cody RN  Outcome: Progressing  Flowsheets (Taken 5/10/2023 2036)  Discharge to home or other facility with appropriate resources:   Identify barriers to discharge with patient and caregiver   Arrange for needed discharge resources and transportation as appropriate   Identify discharge learning needs (meds, wound care, etc)   Refer to discharge planning if patient needs post-hospital services based on physician order or complex needs related to functional status, cognitive ability or social support system  5/10/2023 1419 by Wendy Haney RN  Outcome: Progressing  [address] (Taken 5/10/2023 0030 by Geovanny Maier, RN)  Discharge to home or other facility with appropriate resources:   Identify barriers to discharge with patient and caregiver   Arrange for needed discharge resources and transportation as appropriate   Identify discharge learning needs (meds, wound care, etc)     Problem: Pain  Goal: Verbalizes/displays adequate comfort level or baseline comfort level  5/11/2023 0118 by Zoe Cody RN  Outcome: Progressing  Flowsheets (Taken 5/11/2023 0118)  Verbalizes/displays adequate comfort level or baseline comfort level:   Encourage patient to monitor pain and request assistance   Assess pain using appropriate pain scale   Implement non-pharmacological measures as appropriate and evaluate response   Administer analgesics based on type and severity of pain and evaluate response  5/10/2023 1419 by Wendy Haney RN  Outcome: Progressing  Flowsheets (Taken 5/10/2023 0030 by Geovanny Maier, DARIN)  Verbalizes/displays adequate comfort level or baseline comfort level:   Encourage patient to monitor pain and request assistance   Assess pain using appropriate pain scale   Administer analgesics based on type and severity of pain and evaluate response   Implement non-pharmacological
Problem: Discharge Planning  Goal: Discharge to home or other facility with appropriate resources  5/8/2023 1552 by Rosa Serrano RN  Outcome: Progressing  Flowsheets (Taken 5/7/2023 1944 by Alanna Salinas RN)  Discharge to home or other facility with appropriate resources:   Identify barriers to discharge with patient and caregiver   Arrange for needed discharge resources and transportation as appropriate   Identify discharge learning needs (meds, wound care, etc)   Refer to discharge planning if patient needs post-hospital services based on physician order or complex needs related to functional status, cognitive ability or social support system  5/8/2023 0253 by Alanna Salinas RN  Outcome: Progressing  Flowsheets (Taken 5/7/2023 1944)  Discharge to home or other facility with appropriate resources:   Identify barriers to discharge with patient and caregiver   Arrange for needed discharge resources and transportation as appropriate   Identify discharge learning needs (meds, wound care, etc)   Refer to discharge planning if patient needs post-hospital services based on physician order or complex needs related to functional status, cognitive ability or social support system     Problem: Pain  Goal: Verbalizes/displays adequate comfort level or baseline comfort level  5/8/2023 1552 by Rosa Serrano RN  Outcome: Progressing  Flowsheets (Taken 5/7/2023 1938 by Alanna Salinas, RN)  Verbalizes/displays adequate comfort level or baseline comfort level:   Encourage patient to monitor pain and request assistance   Assess pain using appropriate pain scale   Administer analgesics based on type and severity of pain and evaluate response   Implement non-pharmacological measures as appropriate and evaluate response   Consider cultural and social influences on pain and pain management  5/8/2023 0253 by Alanna Salinas RN  Outcome: Progressing  Flowsheets (Taken 5/7/2023 1938)  Verbalizes/displays adequate comfort level
Problem: Discharge Planning  Goal: Discharge to home or other facility with appropriate resources  5/8/2023 2300 by Gaurang Vann RN  Outcome: Progressing  Flowsheets (Taken 5/8/2023 2300)  Discharge to home or other facility with appropriate resources:   Identify barriers to discharge with patient and caregiver   Arrange for needed discharge resources and transportation as appropriate   Identify discharge learning needs (meds, wound care, etc)     Problem: Pain  Goal: Verbalizes/displays adequate comfort level or baseline comfort level  5/8/2023 2300 by Gaurang Vann RN  Outcome: Progressing  Flowsheets (Taken 5/8/2023 2300)  Verbalizes/displays adequate comfort level or baseline comfort level:   Encourage patient to monitor pain and request assistance   Assess pain using appropriate pain scale   Administer analgesics based on type and severity of pain and evaluate response   Implement non-pharmacological measures as appropriate and evaluate response     Problem: ABCDS Injury Assessment  Goal: Absence of physical injury  5/8/2023 2300 by Gaurang Vann RN  Outcome: Progressing  Flowsheets (Taken 5/8/2023 2300)  Absence of Physical Injury: Implement safety measures based on patient assessment     Problem: Gastrointestinal - Adult  Goal: Maintains or returns to baseline bowel function  5/8/2023 2300 by Gaurang Vann RN  Outcome: Progressing  Flowsheets (Taken 5/8/2023 2300)  Maintains or returns to baseline bowel function:   Assess bowel function   Administer IV fluids as ordered to ensure adequate hydration   Administer ordered medications as needed   Encourage mobilization and activity   Encourage oral fluids to ensure adequate hydration     Problem: Genitourinary - Adult  Goal: Absence of urinary retention  5/8/2023 2300 by Gaurang Vann RN  Outcome: Progressing  Flowsheets (Taken 5/8/2023 2300)  Absence of urinary retention:   Assess patients ability to void and empty bladder   Monitor intake/output and
Problem: Discharge Planning  Goal: Discharge to home or other facility with appropriate resources  Outcome: Progressing  Flowsheets (Taken 5/8/2023 2300 by Brisa Yin, RN)  Discharge to home or other facility with appropriate resources:   Identify barriers to discharge with patient and caregiver   Arrange for needed discharge resources and transportation as appropriate   Identify discharge learning needs (meds, wound care, etc)     Problem: Pain  Goal: Verbalizes/displays adequate comfort level or baseline comfort level  Outcome: Progressing  Flowsheets (Taken 5/8/2023 2300 by Brisa Yin, RN)  Verbalizes/displays adequate comfort level or baseline comfort level:   Encourage patient to monitor pain and request assistance   Assess pain using appropriate pain scale   Administer analgesics based on type and severity of pain and evaluate response   Implement non-pharmacological measures as appropriate and evaluate response     Problem: ABCDS Injury Assessment  Goal: Absence of physical injury  Outcome: Progressing  Flowsheets (Taken 5/8/2023 2300 by Brisa Yin RN)  Absence of Physical Injury: Implement safety measures based on patient assessment     Problem: Gastrointestinal - Adult  Goal: Minimal or absence of nausea and vomiting  Outcome: Progressing  Flowsheets (Taken 5/7/2023 1944 by Luzma Doss RN)  Minimal or absence of nausea and vomiting:   Administer IV fluids as ordered to ensure adequate hydration   Administer ordered antiemetic medications as needed   Provide nonpharmacologic comfort measures as appropriate     Problem: Gastrointestinal - Adult  Goal: Maintains or returns to baseline bowel function  Outcome: Progressing  Flowsheets (Taken 5/8/2023 2300 by Brisa Yin, RN)  Maintains or returns to baseline bowel function:   Assess bowel function   Administer IV fluids as ordered to ensure adequate hydration   Administer ordered medications as needed   Encourage mobilization and activity
function  Outcome: Progressing  Flowsheets (Taken 5/7/2023 1944)  Maintains or returns to baseline bowel function:   Assess bowel function   Encourage oral fluids to ensure adequate hydration   Administer IV fluids as ordered to ensure adequate hydration   Administer ordered medications as needed   Encourage mobilization and activity     Problem: Gastrointestinal - Adult  Goal: Maintains adequate nutritional intake  Outcome: Progressing  Flowsheets (Taken 5/7/2023 1944)  Maintains adequate nutritional intake:   Monitor percentage of each meal consumed   Identify factors contributing to decreased intake, treat as appropriate     Problem: Genitourinary - Adult  Goal: Absence of urinary retention  Outcome: Progressing  Flowsheets (Taken 5/7/2023 1944)  Absence of urinary retention:   Assess patients ability to void and empty bladder   Monitor intake/output and perform bladder scan as needed     Problem: Infection - Adult  Goal: Absence of infection during hospitalization  Outcome: Progressing  Flowsheets (Taken 5/7/2023 1944)  Absence of infection during hospitalization:   Assess and monitor for signs and symptoms of infection   Monitor lab/diagnostic results   Monitor all insertion sites i.e., indwelling lines, tubes and drains   [place] appropriate cooling/warming therapies per order   Administer medications as ordered   Instruct and encourage patient and family to use good hand hygiene technique     Problem: Metabolic/Fluid and Electrolytes - Adult  Goal: Electrolytes maintained within normal limits  Outcome: Progressing  Flowsheets (Taken 5/7/2023 1944)  Electrolytes maintained within normal limits:   Monitor labs and assess patient for signs and symptoms of electrolyte imbalances   Administer electrolyte replacement as ordered   Monitor response to electrolyte replacements, including repeat lab results as appropriate     Problem: Safety - Adult  Goal: Free from fall injury  Outcome: Progressing  Flowsheets
Progressing  Flowsheets (Taken 5/10/2023 2036)  Absence of infection during hospitalization:   Monitor lab/diagnostic results   Assess and monitor for signs and symptoms of infection   Monitor all insertion sites i.e., indwelling lines, tubes and drains   Administer medications as ordered   Instruct and encourage patient and family to use good hand hygiene technique     Problem: Metabolic/Fluid and Electrolytes - Adult  Goal: Electrolytes maintained within normal limits  5/11/2023 0954 by Cong Sher RN  Outcome: Progressing  Flowsheets (Taken 5/10/2023 2036 by Korey Ceja RN)  Electrolytes maintained within normal limits: Monitor labs and assess patient for signs and symptoms of electrolyte imbalances  5/11/2023 0118 by Korey Ceja RN  Outcome: Progressing  Flowsheets (Taken 5/10/2023 2036)  Electrolytes maintained within normal limits: Monitor labs and assess patient for signs and symptoms of electrolyte imbalances     Problem: Safety - Adult  Goal: Free from fall injury  5/11/2023 0954 by Cong Sher RN  Outcome: Progressing  Flowsheets (Taken 5/10/2023 0030 by Ozzie Enamorado RN)  Free From Fall Injury: Instruct family/caregiver on patient safety  5/11/2023 0118 by Korey Ceja RN  Outcome: Progressing  Flowsheets (Taken 5/10/2023 0030 by Ozzie Enamorado RN)  Free From Fall Injury: Instruct family/caregiver on patient safety   Care plan reviewed with patient. Patient verbalize understanding of the plan of care and contribute to goal setting.

## 2023-05-11 NOTE — CARE COORDINATION
5/11/23, 1:30 PM EDT    Patient goals/plan/ treatment preferences discussed by  and . Patient goals/plan/ treatment preferences reviewed with patient/ family. Patient/ family verbalize understanding of discharge plan and are in agreement with goal/plan/treatment preferences. Understanding was demonstrated using the teach back method. AVS provided by RN at time of discharge, which includes all necessary medical information pertaining to the patients current course of illness, treatment, post-discharge goals of care, and treatment preferences.      Services At/After Discharge: None       IMM Letter  IMM Letter given to Patient/Family/Significant other/Guardian/POA/by[de-identified]   IMM Letter date given[de-identified] 05/09/23  IMM Letter time given[de-identified] [id number]

## 2023-05-11 NOTE — PROGRESS NOTES
Comprehensive Nutrition Assessment    Type and Reason for Visit:  Initial, RD Nutrition Re-Screen/LOS (LOS day 9)    Nutrition Recommendations/Plan:   Advance diet when medically appropriate and encourage PO intake at best efforts as tolerated. Trial ONS: Ensure clear BID  Monitoring all nutrition aspects and will provide additional recommendations as necessary and appropriate. Malnutrition Assessment:  Malnutrition Status: At risk for malnutrition (Comment) (05/11/23 5896)    Context:  Acute Illness     Findings of the 6 clinical characteristics of malnutrition:  Energy Intake:   (pt reports poor intake x 1 week PTA~ improved now)  Weight Loss:  Unable to assess (suspect inaccurate weights this admit)     Body Fat Loss:  No significant body fat loss     Muscle Mass Loss:  No significant muscle mass loss    Fluid Accumulation:  No significant fluid accumulation     Strength:  Not Performed    Nutrition Assessment:     Pt. nutritionally compromised AEB extended LOS and variable intake x2 weeks. At risk for further nutrition compromise r/t recurrent ventral hernia-s/p ex lap with small bowel resection 5/3 and underlying medical condition (PMH: CAD, diverticulitis, GERD, HTN, constipation with frequent laxative use). Nutrition Related Findings:    Pt. Report/Treatments/Miscellaneous: spoke with patient at bedside this morning. NPO for SBFT today. Pt reports she has been tolerating diet~ she just has not had a bowel movement yet. Pt reports she was mainly only drinking water for ~7 days.   GI Status: last BM 4/29, slight abdomen distention   Pertinent Labs: Na 142, K 4.1, BUN 5, creatinine 0.4, glucose 99  Pertinent Meds: reglan, toradol, protonix, glycolax      Wound Type:  (abdominal incision- s/p ex lap with small bowel resection primary repair of recurrent ventral hernia)       Current Nutrition Intake & Therapies:    Average Meal Intake:  (pt reports fair intake)  Average Supplements Intake:
Discharge instructions gone over with patient and spouse, including follow up appointment, rx called into pharmacy,  post op instructions gone over with patient and spouse,  they voiced understanding,  no concerns noted at this time
[address] [facility]    postoperative progress note  Dr Meghan Reeder covering for Dr Gwen Fiore       Pt Name: Annalise Davila  MRN: [id number]  YOB: 1965  Date of evaluation: 5/8/2023    IMPRESSIONS   POD# 5 S/p Exploratory laparotomy with small bowel resection, TAYLOR, removal of mesh, primary repair of recurrent ventral hernia   Ventral hernia primary closure  Postoperative pain/cramping   GERD  HTN  HX constipation uses laxatives 1-2 times weekly   Passing flatus, no bowel movements    has a past medical history of Arthritis, CAD (coronary artery disease), Colon polyps, Diverticulitis, GERD (gastroesophageal reflux disease), History of blood transfusion, Hypertension, ITP secondary to infection ([address] Lupus ([address] Raynaud disease, Rheumatoid arthritis ([address] and Sjogren's syndrome ([address] PLANS     Routine incisional care   Analgesia and antiemetics as needed  Stool regimen added Reglan  Soft diet  GI prophylaxis   DVT prophylaxis   Activity as tolerated, C&DB, IS ambulate   Abdominal binder   Discharge planning - waiting bowel function  SUBJECTIVE   Patient in bed,  Pain is controlled. Incisions look good. Poor appetite, denies bloating or abdominal distention. She is passing flatus, no BM. She is tolerating a soft diet. Discussed Reglan and will start today. Chart has been reviewed. Past Medical History   has a past medical history of Arthritis, CAD (coronary artery disease), Colon polyps, Diverticulitis, GERD (gastroesophageal reflux disease), History of blood transfusion, Hypertension, ITP secondary to infection ([address] Lupus ([address] Raynaud disease, Rheumatoid arthritis ([address] and Sjogren's syndrome ([address] Past Surgical History   has a past surgical history that includes Cardiac surgery; Tonsillectomy; Adenoidectomy; Cholecystectomy; Appendectomy; Abdomen surgery; back surgery;  Foot surgery; hernia repair; Hysterectomy; Colonoscopy; Endoscopy, colon, diagnostic; Esophagus
[address] [facility]    postoperative progress note  Dr Novoa Dear covering for Dr Elizabeth Hollis       Pt Name: Amandeep Rivera  MRN: [id number]  YOB: 1965  Date of evaluation: 5/9/2023    IMPRESSIONS   POD# 6 S/p Exploratory laparotomy with small bowel resection, TAYLOR, removal of mesh, primary repair of recurrent ventral hernia   Ventral hernia primary closure  Postoperative pain/cramping/spasms  GERD  HTN  HX constipation uses laxatives 1-2 times weekly, shy bowel   Passing flatus, no bowel movements    has a past medical history of Arthritis, CAD (coronary artery disease), Colon polyps, Diverticulitis, GERD (gastroesophageal reflux disease), History of blood transfusion, Hypertension, ITP secondary to infection ([address] Lupus ([address] Raynaud disease, Rheumatoid arthritis ([address] and Sjogren's syndrome ([address] PLANS     Routine incisional care   Analgesia and antiemetics as needed adjusted   Stool regimen plus Reglan, added suppository  Soft diet  GI prophylaxis   DVT prophylaxis   Activity as tolerated, C&DB, IS ambulate   Abdominal binder   Labs today   Discharge planning - waiting bowel function  SUBJECTIVE   Patient sitting edge of bed,  in room. She ate 1/3 of breakfast, denies N&V. Pain is controlled with medications, needed to adjust and discontinue IV dilaudid, discussed with Primary RN and patient. Incisions look good. Poor appetite, denies bloating or abdominal distention. She is passing flatus, no BM. She is tolerating a soft diet. Chart has been reviewed and updated by Primary RN        Past Medical History   has a past medical history of Arthritis, CAD (coronary artery disease), Colon polyps, Diverticulitis, GERD (gastroesophageal reflux disease), History of blood transfusion, Hypertension, ITP secondary to infection ([address] Lupus ([address] Raynaud disease, Rheumatoid arthritis ([address] and Sjogren's syndrome ([address]   Past Surgical History   has a past surgical history
aunt, maternal aunt, and paternal grandfather; Colon Cancer in her father and paternal uncle; Early Death in her brother; High Blood Pressure in her brother, father, and paternal grandmother; Osteoarthritis in her maternal grandmother and mother; Other in her maternal grandmother and mother; Stroke in her maternal grandmother; Vision Loss in her maternal grandmother. Social History   reports that she quit smoking about 4 years ago. Her smoking use included cigarettes. She smoked an average of .5 packs per day. She has never used smokeless tobacco. She reports that she does not drink alcohol and does not use drugs. Review of Systems:   10 point ROS otherwise negative unless noted above   OBJECTIVE   CURRENT VITALS:  height is 5' 4\" (1.626 m) and weight is 216 lb 8 oz (98.2 kg). Her oral temperature is 98 °F (36.7 °C). Her blood pressure is 114/71 and her pulse is 75. Her respiration is 17 and oxygen saturation is 94%. Body mass index is 37.16 kg/m². Temperature Range (24h):Temp: 98 °F (36.7 °C) Temp  Av °F (36.7 °C)  Min: 97.7 °F (36.5 °C)  Max: 98.5 °F (36.9 °C)  BP Range (87I): Systolic (62OJU), VHV:192 , Min:114 , UO     Diastolic (82EHX), LUV:29, Min:69, Max:83    Pulse Range (24h): Pulse  Av.6  Min: 70  Max: 78  Respiration Range (24h): Resp  Av.1  Min: 16  Max: 18  Current Pulse Ox (24h):  SpO2: 94 %  Pulse Ox Range (24h):  SpO2  Av %  Min: 93 %  Max: 95 %  Oxygen Amount and Delivery: O2 Flow Rate (L/min): 1 L/min  CONSTITUTIONAL: awake, alert, cooperative, no apparent distress  SKIN: Skin color, texture, turgor normal. No rashes or lesions. HEENT: Head is normocephalic, atraumatic.    CHEST/LUNGS: chest symmetric with normal A/P diameter, normal respiratory rate and rhythm, lungs clear to auscultation   CARDIOVASCULAR: Heart sounds are normal.  Regular rate and rhythm  ABDOMEN: obese, tender, BS active, softly, distention present today   Surgical Incision: well approximated, no
brother, father, and paternal grandmother; Osteoarthritis in her maternal grandmother and mother; Other in her maternal grandmother and mother; Stroke in her maternal grandmother; Vision Loss in her maternal grandmother. Social History   reports that she quit smoking about 4 years ago. Her smoking use included cigarettes. She smoked an average of .5 packs per day. She has never used smokeless tobacco. She reports that she does not drink alcohol and does not use drugs. Review of Systems:   10 point ROS otherwise negative unless noted above   OBJECTIVE   CURRENT VITALS:  height is 5' 4\" (1.626 m) and weight is 216 lb 8 oz (98.2 kg). Her oral temperature is 97.9 °F (36.6 °C). Her blood pressure is 125/77 and her pulse is 70. Her respiration is 18 and oxygen saturation is 93%. Body mass index is 37.16 kg/m². Temperature Range (24h):Temp: 97.9 °F (36.6 °C) Temp  Av.9 °F (36.6 °C)  Min: 97.8 °F (36.6 °C)  Max: 98 °F (36.7 °C)  BP Range (42G): Systolic (42QLE), KATHARINE:875 , Min:113 , JID:765     Diastolic (46JKX), TQI:91, Min:71, Max:81    Pulse Range (24h): Pulse  Av  Min: 68  Max: 77  Respiration Range (24h): Resp  Av.1  Min: 16  Max: 18  Current Pulse Ox (24h):  SpO2: 93 %  Pulse Ox Range (24h):  SpO2  Av.8 %  Min: 93 %  Max: 99 %  Oxygen Amount and Delivery: O2 Flow Rate (L/min): 1 L/min  CONSTITUTIONAL: awake, alert, cooperative, no apparent distress  SKIN: Skin color, texture, turgor normal. No rashes or lesions. HEENT: Head is normocephalic, atraumatic. CHEST/LUNGS: chest symmetric with normal A/P diameter, normal respiratory rate and rhythm, lungs clear to auscultation   CARDIOVASCULAR: Heart sounds are normal.  Regular rate and rhythm  ABDOMEN: obese, tender, BS active, softly, distention improved   Surgical Incision: well approximated, no drainage, no erythema   NEUROLOGIC: There are no focalizing motor or sensory deficits. CN II-XII are grossly intact. Zebedee Martyr    EXTREMITIES: no cyanosis, no

## 2023-05-19 PROBLEM — R56.9 SEIZURES (HCC): Status: ACTIVE | Noted: 2023-05-19

## 2023-05-19 PROBLEM — K21.9 GERD (GASTROESOPHAGEAL REFLUX DISEASE): Status: ACTIVE | Noted: 2023-05-19

## 2023-05-19 PROBLEM — M79.7 FIBROMYALGIA: Status: ACTIVE | Noted: 2023-05-19

## 2023-05-19 PROBLEM — F32.A DEPRESSION: Status: ACTIVE | Noted: 2023-05-19

## 2023-05-19 PROBLEM — M48.062 SPINAL STENOSIS OF LUMBAR REGION WITH NEUROGENIC CLAUDICATION: Status: ACTIVE | Noted: 2017-12-06

## 2023-05-19 PROBLEM — R01.1 HEART MURMUR: Status: ACTIVE | Noted: 2023-05-19

## 2023-05-19 PROBLEM — M32.9 LUPUS (HCC): Status: ACTIVE | Noted: 2023-05-19

## 2023-05-19 PROBLEM — D69.3 IDIOPATHIC THROMBOCYTOPENIC PURPURA (HCC): Status: ACTIVE | Noted: 2023-05-19

## 2023-05-19 PROBLEM — I37.0 PULMONARY STENOSIS: Status: ACTIVE | Noted: 2023-05-19

## 2023-05-19 PROBLEM — D12.6 COLON ADENOMAS: Status: ACTIVE | Noted: 2023-05-19

## 2023-05-19 PROBLEM — J30.2 SEASONAL ALLERGIES: Status: ACTIVE | Noted: 2023-05-19

## 2023-05-23 ENCOUNTER — OFFICE VISIT (OUTPATIENT)
Dept: SURGERY | Age: 58
End: 2023-05-23

## 2023-05-23 VITALS
BODY MASS INDEX: 37.16 KG/M2 | DIASTOLIC BLOOD PRESSURE: 70 MMHG | TEMPERATURE: 97.5 F | HEART RATE: 89 BPM | HEIGHT: 64 IN | RESPIRATION RATE: 18 BRPM | OXYGEN SATURATION: 97 % | SYSTOLIC BLOOD PRESSURE: 130 MMHG

## 2023-05-23 DIAGNOSIS — G89.18 POST-OP PAIN: Primary | ICD-10-CM

## 2023-05-23 PROCEDURE — 99024 POSTOP FOLLOW-UP VISIT: CPT | Performed by: NURSE PRACTITIONER

## 2023-05-23 RX ORDER — OXYCODONE HYDROCHLORIDE 5 MG/1
5 TABLET ORAL EVERY 6 HOURS PRN
Qty: 12 TABLET | Refills: 0 | Status: SHIPPED | OUTPATIENT
Start: 2023-05-23 | End: 2023-05-30

## 2023-05-23 ASSESSMENT — ENCOUNTER SYMPTOMS
SHORTNESS OF BREATH: 0
ABDOMINAL PAIN: 0
VOMITING: 0
NAUSEA: 0

## 2023-05-23 NOTE — PROGRESS NOTES
[address] [address]  Dept: [phone]  Dept Fax: [phone]  Loc: [phone]    Visit Date: 5/23/2023       Che Abbott is a 62 y.o. female who presents today for:  Chief Complaint   Patient presents with    Surgical Consult     S/p EXPLORATORY LAPAROTOMY WITH SMALL BOWEL RESECTION, LYSIS OF ADHESIONS, REMOVAL OF MESH, PRIMARY REPAIR OF RECURRENT VENTRAL HERNIA 5/03/23       HPI:     Mikey Hogan presents today for a post op check. Today She has postoperative discomfort as expected, still requiring pain medication. Appetite is slowly returning, denies N&V. Her bowel movements have improved and has a bowel movement daily. She has required one dose of laxatives since being home. The incision looks good, no drainage no erythema no signs of infection. There is no sign of hernia recurrence. Discussed continued weight restrictions. Verbalized understanding, no questions or concerns at this time. Plan to follow up in one week as discussed.        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 performed by Aleida Tillman MD at [facility]

## 2023-05-23 NOTE — PATIENT INSTRUCTIONS
Vesta 15 okay to resume normal activity, continue to be mindful lifting to reduce the risk of recurrent hernia

## 2023-06-06 ENCOUNTER — OFFICE VISIT (OUTPATIENT)
Dept: SURGERY | Age: 58
End: 2023-06-06

## 2023-06-06 VITALS
TEMPERATURE: 97.8 F | SYSTOLIC BLOOD PRESSURE: 130 MMHG | HEIGHT: 64 IN | HEART RATE: 80 BPM | OXYGEN SATURATION: 97 % | DIASTOLIC BLOOD PRESSURE: 72 MMHG | BODY MASS INDEX: 37.16 KG/M2 | RESPIRATION RATE: 18 BRPM

## 2023-06-06 DIAGNOSIS — Z98.890 S/P EXPLORATORY LAPAROTOMY: Primary | ICD-10-CM

## 2023-06-06 PROCEDURE — 99024 POSTOP FOLLOW-UP VISIT: CPT | Performed by: NURSE PRACTITIONER

## 2023-06-06 NOTE — PROGRESS NOTES
History   Problem Relation Age of Onset    Other Mother         dementia    Osteoarthritis Mother     Arthritis Mother     High Blood Pressure Father     Colon Cancer Father     Cancer Father     Early Death Brother     High Blood Pressure Brother     Cancer Maternal Aunt         leukemia    Cancer Maternal Aunt         stomach    Breast Cancer Paternal Aunt     Colon Cancer Paternal Uncle     Other Maternal Grandmother         alzheimers    Vision Loss Maternal Grandmother     Stroke Maternal Grandmother     Osteoarthritis Maternal Grandmother     Arthritis Maternal Grandmother     High Blood Pressure Paternal Grandmother     Cancer Paternal Grandfather         stomach    Alcohol Abuse Neg Hx     Allergy (Severe) Neg Hx     Anemia Neg Hx     Arrhythmia Neg Hx     Atrial Fibrillation Neg Hx     Asthma Neg Hx     Birth Defects Neg Hx     Bleeding Prob Neg Hx     Clotting Disorder Neg Hx     Chdhd Arthritis Neg Hx     Coronary Art Dis Neg Hx     Depression Neg Hx     Diabetes Neg Hx     Gout Neg Hx     Hearing Loss Neg Hx     Heart Attack Neg Hx     High Cholesterol Neg Hx     Immune Disorder Neg Hx     Kidney Disease Neg Hx     Learning Disabilities Neg Hx     Mental Illness Neg Hx     Miscarriages / Stillbirths Neg Hx     Lupus Neg Hx     Obesity Neg Hx     Osteoporosis Neg Hx     Prostate Cancer Neg Hx     Psoriasis Neg Hx     Rheum Arthritis Neg Hx     Spondylitis Neg Hx     Substance Abuse Neg Hx      Social History     Tobacco Use    Smoking status: Former     Packs/day: 0.50     Types: Cigarettes     Quit date: 2019     Years since quittin.3    Smokeless tobacco: Never   Substance Use Topics    Alcohol use: No        Current Outpatient Medications   Medication Sig Dispense Refill    Magnesium 100 MG CAPS Take 200 mg by mouth daily as needed (for leg cramps)      amLODIPine (NORVASC) 10 MG tablet TAKE 1 TABLET EVERY DAY 90 tablet 0    calcium carbonate (TUMS EX) 750 MG chewable tablet Take 1 tablet by

## 2023-06-07 ASSESSMENT — ENCOUNTER SYMPTOMS
SHORTNESS OF BREATH: 0
VOMITING: 0
ABDOMINAL PAIN: 0
NAUSEA: 0

## 2023-06-19 RX ORDER — AMLODIPINE BESYLATE 10 MG/1
TABLET ORAL
Qty: 90 TABLET | Refills: 0 | OUTPATIENT
Start: 2023-06-19

## 2023-07-18 ENCOUNTER — OFFICE VISIT (OUTPATIENT)
Dept: SURGERY | Age: 58
End: 2023-07-18

## 2023-07-18 VITALS
WEIGHT: 183.5 LBS | BODY MASS INDEX: 31.33 KG/M2 | TEMPERATURE: 97.8 F | HEIGHT: 64 IN | OXYGEN SATURATION: 99 % | DIASTOLIC BLOOD PRESSURE: 64 MMHG | SYSTOLIC BLOOD PRESSURE: 128 MMHG | RESPIRATION RATE: 16 BRPM | HEART RATE: 75 BPM

## 2023-07-18 DIAGNOSIS — Z98.890 S/P EXPLORATORY LAPAROTOMY: ICD-10-CM

## 2023-07-18 DIAGNOSIS — K59.09 OTHER CONSTIPATION: Primary | ICD-10-CM

## 2023-07-18 DIAGNOSIS — R33.9 URINARY RETENTION: ICD-10-CM

## 2023-07-18 PROCEDURE — 99024 POSTOP FOLLOW-UP VISIT: CPT | Performed by: NURSE PRACTITIONER

## 2023-07-18 ASSESSMENT — ENCOUNTER SYMPTOMS
VOMITING: 0
NAUSEA: 0
CONSTIPATION: 1
ABDOMINAL DISTENTION: 1
SHORTNESS OF BREATH: 0
ABDOMINAL PAIN: 0

## 2023-07-18 NOTE — PROGRESS NOTES
[facility] [address]  Dept: [phone]  Dept Fax: [phone]  Loc: [phone]    Visit Date: 7/18/2023       Cynthia Brunson is a 62 y.o. female who presents today for:  Chief Complaint   Patient presents with    Post-Op Check     s/p EXPLORATORY LAPAROTOMY WITH SMALL BOWEL RESECTION, LYSIS OF ADHESIONS, REMOVAL OF MESH, PRIMARY REPAIR OF RECURRENT VENTRAL HERNIA 5/03/23-LOV- 6/6/23         HPI:     Jeremy Gonzalez presents today for a post op check. Today She complains of constipation, bloating and urinary retention. She states she has not had a bowel movement in 5 days despite taking 2 dulcolax everyday. She has bloating and urinary retention. Denies abdominal pain or N&V. Appetite is slowly getting back to normal.  No signs of hernia recurrence. She needs a follow up colonoscopy. She has attempted to call [facility] 2 times and left voice mails with no response back. Plan to order a CT scan would concerned with bowel blockage or a stricture. Also suggested she attempt a Fleets enema or suppository to see if that will help move stool burden. If stool clears and she is able to pass fecal content she can cancel CT scan. Our office will reach out to [facility] for appointment. Advised her to call the office back if she does not hear back by Friday. Incision looks good, no concerns. Plan to follow back up in office after CT scan.          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

## 2023-08-10 ENCOUNTER — HOSPITAL ENCOUNTER (OUTPATIENT)
Dept: CT IMAGING | Age: 58
Discharge: HOME | End: 2023-08-10
Attending: NURSE PRACTITIONER
Payer: MEDICARE

## 2023-08-10 DIAGNOSIS — Z98.890 S/P EXPLORATORY LAPAROTOMY: ICD-10-CM

## 2023-08-10 DIAGNOSIS — R33.9 URINARY RETENTION: ICD-10-CM

## 2023-08-10 DIAGNOSIS — K59.09 OTHER CONSTIPATION: ICD-10-CM

## 2023-08-10 PROCEDURE — 6360000004 HC RX CONTRAST MEDICATION: Performed by: NURSE PRACTITIONER

## 2023-08-10 PROCEDURE — 74177 CT ABD & PELVIS W/CONTRAST: CPT

## 2023-08-10 RX ADMIN — IOPAMIDOL 80 ML: 755 INJECTION, SOLUTION INTRAVENOUS at 09:15

## 2023-08-10 RX ADMIN — IOHEXOL 50 ML: 240 INJECTION, SOLUTION INTRATHECAL; INTRAVASCULAR; INTRAVENOUS; ORAL at 09:15

## 2023-11-16 ENCOUNTER — TELEPHONE (OUTPATIENT)
Dept: SURGERY | Age: 58
End: 2023-11-16

## 2023-11-16 ENCOUNTER — OFFICE VISIT (OUTPATIENT)
Dept: SURGERY | Age: 58
End: 2023-11-16
Payer: MEDICARE

## 2023-11-16 VITALS
TEMPERATURE: 97.5 F | DIASTOLIC BLOOD PRESSURE: 62 MMHG | HEIGHT: 64 IN | RESPIRATION RATE: 16 BRPM | OXYGEN SATURATION: 95 % | WEIGHT: 182.2 LBS | BODY MASS INDEX: 31.1 KG/M2 | SYSTOLIC BLOOD PRESSURE: 122 MMHG | HEART RATE: 77 BPM

## 2023-11-16 DIAGNOSIS — K43.0 INCISIONAL HERNIA WITH OBSTRUCTION BUT NO GANGRENE: Primary | ICD-10-CM

## 2023-11-16 PROCEDURE — G8484 FLU IMMUNIZE NO ADMIN: HCPCS | Performed by: SURGERY

## 2023-11-16 PROCEDURE — 3017F COLORECTAL CA SCREEN DOC REV: CPT | Performed by: SURGERY

## 2023-11-16 PROCEDURE — G8427 DOCREV CUR MEDS BY ELIG CLIN: HCPCS | Performed by: SURGERY

## 2023-11-16 PROCEDURE — 1036F TOBACCO NON-USER: CPT | Performed by: SURGERY

## 2023-11-16 PROCEDURE — 99212 OFFICE O/P EST SF 10 MIN: CPT | Performed by: SURGERY

## 2023-11-16 PROCEDURE — G8417 CALC BMI ABV UP PARAM F/U: HCPCS | Performed by: SURGERY

## 2023-11-16 NOTE — TELEPHONE ENCOUNTER
Referral to [facility] Dr. Yolanda Pereira done.   Clinical records faxed to his office [phone]

## 2023-11-20 ENCOUNTER — TELEPHONE (OUTPATIENT)
Dept: SURGERY | Age: 58
End: 2023-11-20

## 2023-11-20 NOTE — TELEPHONE ENCOUNTER
[facility] called to confirm, patient has appointment with Dr. Burgess Bravo on 2/26/24. Confirmed with patient this is ok.  Any additional pertinent information for Dr. Burgess Bravo to be faxed to [phone]

## 2023-11-28 NOTE — PROGRESS NOTES
Cadence Jean MD  [address] [facility]  Clinic Post op Note    Pt Name: Shelley Murillo  Medical Record Number: [id number]  Date of Birth 1965   Today's Date: 11/28/2023    ASSESSMENT       ICD-10-CM    1. Incisional hernia with obstruction but no gangrene  K43.0            PLAN   I had a long discussion today with the patient discussing her hernia. She had extensive prior hernia repairs. Including what sound like a separation of components at outside hospital.  We discussed with his that her options and abdominal wall mobility is recovery restriction she would be best treated at a tertiary center that does only hernia repairs. She expressed understanding. I will put in referral to her tertiary center  Monie Rahul is doing okay, she does the bulge in her abdominal wall is back. This is expected with the explantation of her mesh that was required at her last operation. Patient states it started to cause her discomfort is difficult for her to get around on a regular basis. Lenka Turcios             CURRENT MEDICATIONS     Current Outpatient Medications on File Prior to Visit   Medication Sig Dispense Refill    linaclotide (LINZESS) 145 MCG capsule Take 1 capsule by mouth every morning (before breakfast)      Magnesium 100 MG CAPS Take 200 mg by mouth daily as needed (for leg cramps)      amLODIPine (NORVASC) 10 MG tablet TAKE 1 TABLET EVERY DAY 90 tablet 0    calcium carbonate (TUMS EX) 750 MG chewable tablet Take 1 tablet by mouth as needed      ergocalciferol (ERGOCALCIFEROL) 1.25 MG (36170 UT) capsule Take 1 capsule by mouth once a week Takes on wednesdays      SUMATRIPTAN SUCCINATE PO Take 25 mg by mouth once as needed for Migraine      loratadine (CLARITIN) 10 MG tablet Take 1 tablet by mouth daily 15 tablet 0    esomeprazole Magnesium (NEXIUM) 40 MG PACK Take 1 packet by mouth daily      acetaminophen (TYLENOL) 325 MG tablet Take 2 tablets by mouth every 6 hours as needed for Pain

## 2024-02-07 ENCOUNTER — HOSPITAL ENCOUNTER (EMERGENCY)
Age: 59
Discharge: HOME | End: 2024-02-07
Payer: MEDICARE

## 2024-02-07 VITALS
HEART RATE: 79 BPM | HEIGHT: 64 IN | SYSTOLIC BLOOD PRESSURE: 158 MMHG | RESPIRATION RATE: 16 BRPM | DIASTOLIC BLOOD PRESSURE: 75 MMHG | BODY MASS INDEX: 30.73 KG/M2 | TEMPERATURE: 99.6 F | WEIGHT: 180 LBS | OXYGEN SATURATION: 96 %

## 2024-02-07 DIAGNOSIS — J10.1 INFLUENZA A: Primary | ICD-10-CM

## 2024-02-07 LAB
FLUAV AG SPEC QL: POSITIVE
FLUBV AG SPEC QL: NEGATIVE

## 2024-02-07 PROCEDURE — 99213 OFFICE O/P EST LOW 20 MIN: CPT

## 2024-02-07 PROCEDURE — 87804 INFLUENZA ASSAY W/OPTIC: CPT

## 2024-02-07 PROCEDURE — 99203 OFFICE O/P NEW LOW 30 MIN: CPT | Performed by: NURSE PRACTITIONER

## 2024-02-07 RX ORDER — PLECANATIDE 3 MG/1
TABLET ORAL
COMMUNITY

## 2024-02-07 RX ORDER — OSELTAMIVIR PHOSPHATE 75 MG/1
75 CAPSULE ORAL 2 TIMES DAILY
Qty: 10 CAPSULE | Refills: 0 | Status: SHIPPED | OUTPATIENT
Start: 2024-02-07 | End: 2024-02-12

## 2024-02-07 ASSESSMENT — ENCOUNTER SYMPTOMS
NAUSEA: 0
SHORTNESS OF BREATH: 0
RHINORRHEA: 1
SINUS PRESSURE: 1
VOMITING: 0
COUGH: 1
SINUS PAIN: 1
DIARRHEA: 1
SORE THROAT: 1
CHEST TIGHTNESS: 0

## 2024-02-07 ASSESSMENT — PAIN DESCRIPTION - LOCATION: LOCATION: EAR

## 2024-02-07 ASSESSMENT — PAIN SCALES - GENERAL: PAINLEVEL_OUTOF10: 4

## 2024-02-07 ASSESSMENT — PAIN - FUNCTIONAL ASSESSMENT: PAIN_FUNCTIONAL_ASSESSMENT: 0-10

## 2024-02-07 ASSESSMENT — PAIN DESCRIPTION - ORIENTATION: ORIENTATION: LEFT;RIGHT

## 2024-02-07 NOTE — ED PROVIDER NOTES
[facility] URGENT CARE  Urgent Care Encounter       CHIEF COMPLAINT       Chief Complaint   Patient presents with    Sinusitis     Pressure drainage     Cough     dry    Otalgia     bilateral    Fever     101.9    Diarrhea     X3 yesterday       Nurses Notes reviewed and I agree except as noted in the HPI.  HISTORY OF PRESENT ILLNESS   Brielle Howard is a 58 y.o. female who presents to the [facility] urgent care for evaluation of sinus pressure.  Reports her symptoms started roughly 48 hours ago.  Reports associated symptoms of congestion, rhinorrhea, postnasal drainage, sinus pressure, cough, otalgia, fever, and diarrhea.  Denies known exposure to someone ill.  Denies nausea or emesis.    The history is provided by the patient. No  was used.       REVIEW OF SYSTEMS     Review of Systems   Constitutional:  Positive for fever. Negative for activity change, appetite change, chills and fatigue.   HENT:  Positive for congestion, postnasal drip, rhinorrhea, sinus pressure, sinus pain and sore throat. Negative for ear discharge and ear pain.    Respiratory:  Positive for cough. Negative for chest tightness and shortness of breath.    Cardiovascular:  Negative for chest pain.   Gastrointestinal:  Positive for diarrhea. Negative for nausea and vomiting.   Genitourinary:  Negative for dysuria.   Skin:  Negative for rash.   Allergic/Immunologic: Negative for environmental allergies and food allergies.   Neurological:  Negative for dizziness and headaches.       PAST MEDICAL HISTORY         Diagnosis Date    Arthritis     Bowel obstruction (HCC)     CAD (coronary artery disease)     Colon polyps     Diverticulitis     GERD (gastroesophageal reflux disease)     History of blood transfusion     Hypertension     ITP secondary to infection (HCC)     Lupus (HCC)     Raynaud disease     Rheumatoid arthritis (HCC)     Sjogren's syndrome (HCC)        SURGICALHISTORY     Patient  has a past surgical  history that includes Cardiac surgery; Tonsillectomy; Adenoidectomy; Cholecystectomy; Appendectomy; Abdomen surgery; back surgery; Foot surgery; hernia repair; Hysterectomy; Endoscopy, colon, diagnostic; Esophagus dilation; Dilatation, esophagus; skin biopsy; laparotomy (N/A, 05/03/2023); Colonoscopy w/ polypectomy (10/04/2023); and Bowel resection.    CURRENT MEDICATIONS       Discharge Medication List as of 2/7/2024  6:49 PM        CONTINUE these medications which have NOT CHANGED    Details   Naproxen Sodium (ALEVE PO) Take by mouthHistorical Med      Hmezfqtvc-[id number]-APAP (REYNALDO-SELTZER PLUS COLD & FLU PO) Take by mouthHistorical Med      Plecanatide (TRULANCE) 3 MG TABS Take by mouthHistorical Med      Apoaequorin (PREVAGEN) 10 MG CAPS Take by mouthHistorical Med      Magnesium 100 MG CAPS Take 200 mg by mouth daily as needed (for leg cramps)Historical Med      amLODIPine (NORVASC) 10 MG tablet TAKE 1 TABLET EVERY DAY, Disp-90 tablet, R-0Normal      calcium carbonate (TUMS EX) 750 MG chewable tablet Take 1 tablet by mouth as neededHistorical Med      ergocalciferol (ERGOCALCIFEROL) 1.25 MG (72538 UT) capsule Take 1 capsule by mouth once a week Takes on wednesdaysHistorical Med      SUMATRIPTAN SUCCINATE PO Take 25 mg by mouth once as needed for MigraineHistorical Med      loratadine (CLARITIN) 10 MG tablet Take 1 tablet by mouth daily, Disp-15 tablet, R-0      esomeprazole Magnesium (NEXIUM) 40 MG PACK Take 1 packet by mouth dailyHistorical Med      acetaminophen (TYLENOL) 325 MG tablet Take 2 tablets by mouth every 6 hours as needed for PainHistorical Med             ALLERGIES     Patient is is allergic to biaxin [clarithromycin].    Patients   Immunization History   Administered Date(s) Administered    COVID-19, PFIZER PURPLE top, DILUTE for use, (age 12 y+), 30mcg/0.3mL 03/29/2021, 04/19/2021, 12/22/2021    Influenza Virus Vaccine 11/22/2004, 11/03/2005, 09/10/2009, 10/13/2011, 10/18/2012, 10/03/2013,

## (undated) DEVICE — BINDER ABD 2XL H12XL60 75IN UNISX STD E 4 PNL DISPOSABLE

## (undated) DEVICE — SUTURE VICRYL PLUS TAPERPOINT ANTIBAC BRD SH NDL 3-0

## (undated) DEVICE — GENERAL LAPAROSCOPY-LF: Brand: MEDLINE INDUSTRIES, INC.

## (undated) DEVICE — YANKAUER,POOLE TIP,STERILE,50/CS: Brand: MEDLINE

## (undated) DEVICE — BARRIER ADH W3XL4IN UTER PELV ABSRB GYNECARE INTCEED

## (undated) DEVICE — SUTURE VCRL + SZ 3-0 L27IN ABSRB UD L26MM SH 1/2 CIR VCP416H

## (undated) DEVICE — TOTAL TRAY, DB, 100% SILI FOLEY, 16FR 10: Brand: MEDLINE

## (undated) DEVICE — PENCIL SMK EVAC ALL IN 1 DSGN ENH VISIBILITY IMPROVED AIR

## (undated) DEVICE — BASIC SINGLE BASIN BTC-LF: Brand: MEDLINE INDUSTRIES, INC.

## (undated) DEVICE — SEALANT TISS 10 CC FIBRIN VISTASEAL

## (undated) DEVICE — SUTURE VCRL + SZ 0 L27IN ABSRB VLT L36MM CT-1 1/2 CIR VCPB260H

## (undated) DEVICE — SEALER ENDOSCP NANO COAT OPN DIV CRV L JAW LIGASURE IMPACT

## (undated) DEVICE — SUTURE ETHLN SZ 3-0 L18IN NONABSORBABLE BLK FS-1 L24MM 3/8 663H

## (undated) DEVICE — STAPLER INT L55MM CUT LN L53MM STPL LN L57MM BLU B FRM 8

## (undated) DEVICE — GOWN,SIRUS,NON REINFRCD,LARGE,SET IN SL: Brand: MEDLINE

## (undated) DEVICE — 450 ML BOTTLE OF 0.05% CHLORHEXIDINE GLUCONATE IN 99.95% STERILE WATER FOR IRRIGATION, USP AND APPLICATOR.: Brand: IRRISEPT ANTIMICROBIAL WOUND LAVAGE

## (undated) DEVICE — SPONGE LAP W18XL18IN WHT COT 4 PLY FLD STRUNG RADPQ DISP ST 2 PER PACK

## (undated) DEVICE — SUTURE ETHLN SZ 2-0 L30IN NONABSORBABLE BLK L36MM FSLX 3/8 1674H

## (undated) DEVICE — YANKAUER,BULB TIP,W/O VENT,RIGID,STERILE: Brand: MEDLINE

## (undated) DEVICE — SUTURE PERMAHAND SZ 3-0 L18IN NONABSORBABLE BLK L26MM SH C013D

## (undated) DEVICE — PAD,NON-ADHERENT,3X8,STERILE,LF,1/PK: Brand: MEDLINE

## (undated) DEVICE — SUTURE STRATAFIX SYMMETRIC SZ 1 L18IN ABSRB VLT CT1 L36CM SXPP1A404

## (undated) DEVICE — RELOAD STPL L55MM OPN H3.8MM CLS H1.5MM WIRE DIA0.2MM REG

## (undated) DEVICE — SUTURE V-LOC 180 SZ 3-0 L9IN ABSRB GRN L26MM V-20 1/2 CIR VLOCL0644

## (undated) DEVICE — GLOVE ORANGE PI 7   MSG9070

## (undated) DEVICE — TUBING, SUCTION, 1/4" X 20', STRAIGHT: Brand: MEDLINE INDUSTRIES, INC.